# Patient Record
Sex: FEMALE | Race: WHITE | Employment: OTHER | URBAN - METROPOLITAN AREA
[De-identification: names, ages, dates, MRNs, and addresses within clinical notes are randomized per-mention and may not be internally consistent; named-entity substitution may affect disease eponyms.]

---

## 2021-01-06 ENCOUNTER — APPOINTMENT (OUTPATIENT)
Dept: GENERAL RADIOLOGY | Age: 76
DRG: 177 | End: 2021-01-06
Payer: MEDICARE

## 2021-01-06 ENCOUNTER — HOSPITAL ENCOUNTER (INPATIENT)
Age: 76
LOS: 3 days | Discharge: HOME | DRG: 177 | End: 2021-01-09
Attending: EMERGENCY MEDICINE | Admitting: INTERNAL MEDICINE
Payer: MEDICARE

## 2021-01-06 DIAGNOSIS — R09.02 HYPOXIA: Primary | ICD-10-CM

## 2021-01-06 DIAGNOSIS — Z20.822 SUSPECTED COVID-19 VIRUS INFECTION: ICD-10-CM

## 2021-01-06 PROBLEM — J12.9 VIRAL PNEUMONIA: Status: ACTIVE | Noted: 2021-01-06

## 2021-01-06 LAB
ANION GAP SERPL CALCULATED.3IONS-SCNC: 16 MMOL/L (ref 3–16)
BASE EXCESS VENOUS: -0.9 MMOL/L (ref -2–3)
BASOPHILS ABSOLUTE: 0 K/UL (ref 0–0.2)
BASOPHILS RELATIVE PERCENT: 0.1 %
BUN BLDV-MCNC: 18 MG/DL (ref 7–20)
C-REACTIVE PROTEIN: 115.4 MG/L (ref 0–5.1)
CALCIUM SERPL-MCNC: 9.7 MG/DL (ref 8.3–10.6)
CARBOXYHEMOGLOBIN: 1.9 % (ref 0–1.5)
CHLORIDE BLD-SCNC: 98 MMOL/L (ref 99–110)
CO2: 22 MMOL/L (ref 21–32)
CREAT SERPL-MCNC: 0.9 MG/DL (ref 0.6–1.2)
EKG ATRIAL RATE: 82 BPM
EKG DIAGNOSIS: NORMAL
EKG P AXIS: 41 DEGREES
EKG P-R INTERVAL: 176 MS
EKG Q-T INTERVAL: 396 MS
EKG QRS DURATION: 92 MS
EKG QTC CALCULATION (BAZETT): 462 MS
EKG R AXIS: -14 DEGREES
EKG T AXIS: 52 DEGREES
EKG VENTRICULAR RATE: 82 BPM
EOSINOPHILS ABSOLUTE: 0 K/UL (ref 0–0.6)
EOSINOPHILS RELATIVE PERCENT: 0.2 %
FERRITIN: 511.2 NG/ML (ref 15–150)
GFR AFRICAN AMERICAN: >60
GFR NON-AFRICAN AMERICAN: >60
GLUCOSE BLD-MCNC: 187 MG/DL (ref 70–99)
GLUCOSE BLD-MCNC: 263 MG/DL (ref 70–99)
HCO3 VENOUS: 24 MMOL/L (ref 24–28)
HCT VFR BLD CALC: 37.4 % (ref 36–48)
HEMOGLOBIN, VEN, REDUCED: 27.9 %
HEMOGLOBIN: 12.5 G/DL (ref 12–16)
LACTATE DEHYDROGENASE: 263 U/L (ref 100–190)
LACTIC ACID: 1.1 MMOL/L (ref 0.4–2)
LACTIC ACID: 2.3 MMOL/L (ref 0.4–2)
LYMPHOCYTES ABSOLUTE: 0.8 K/UL (ref 1–5.1)
LYMPHOCYTES RELATIVE PERCENT: 8.7 %
MCH RBC QN AUTO: 29.1 PG (ref 26–34)
MCHC RBC AUTO-ENTMCNC: 33.5 G/DL (ref 31–36)
MCV RBC AUTO: 86.9 FL (ref 80–100)
METHEMOGLOBIN VENOUS: 0.5 % (ref 0–1.5)
MONOCYTES ABSOLUTE: 0.9 K/UL (ref 0–1.3)
MONOCYTES RELATIVE PERCENT: 9.5 %
NEUTROPHILS ABSOLUTE: 7.6 K/UL (ref 1.7–7.7)
NEUTROPHILS RELATIVE PERCENT: 81.5 %
O2 SAT, VEN: 71 %
PCO2, VEN: 43.1 MMHG (ref 41–51)
PDW BLD-RTO: 14.3 % (ref 12.4–15.4)
PERFORMED ON: ABNORMAL
PH VENOUS: 7.36 (ref 7.35–7.45)
PLATELET # BLD: 203 K/UL (ref 135–450)
PMV BLD AUTO: 8.7 FL (ref 5–10.5)
PO2, VEN: 44.2 MMHG (ref 25–40)
POTASSIUM REFLEX MAGNESIUM: 4 MMOL/L (ref 3.5–5.1)
PRO-BNP: 87 PG/ML (ref 0–449)
PROCALCITONIN: 0.16 NG/ML (ref 0–0.15)
RBC # BLD: 4.3 M/UL (ref 4–5.2)
SODIUM BLD-SCNC: 136 MMOL/L (ref 136–145)
TCO2 CALC VENOUS: 25 MMOL/L
TROPONIN: <0.01 NG/ML
WBC # BLD: 9.3 K/UL (ref 4–11)

## 2021-01-06 PROCEDURE — 84484 ASSAY OF TROPONIN QUANT: CPT

## 2021-01-06 PROCEDURE — 36415 COLL VENOUS BLD VENIPUNCTURE: CPT

## 2021-01-06 PROCEDURE — 71045 X-RAY EXAM CHEST 1 VIEW: CPT

## 2021-01-06 PROCEDURE — 82728 ASSAY OF FERRITIN: CPT

## 2021-01-06 PROCEDURE — 96374 THER/PROPH/DIAG INJ IV PUSH: CPT

## 2021-01-06 PROCEDURE — 1200000000 HC SEMI PRIVATE

## 2021-01-06 PROCEDURE — 2700000000 HC OXYGEN THERAPY PER DAY

## 2021-01-06 PROCEDURE — 86140 C-REACTIVE PROTEIN: CPT

## 2021-01-06 PROCEDURE — 93005 ELECTROCARDIOGRAM TRACING: CPT | Performed by: PHYSICIAN ASSISTANT

## 2021-01-06 PROCEDURE — 6370000000 HC RX 637 (ALT 250 FOR IP): Performed by: STUDENT IN AN ORGANIZED HEALTH CARE EDUCATION/TRAINING PROGRAM

## 2021-01-06 PROCEDURE — 94761 N-INVAS EAR/PLS OXIMETRY MLT: CPT

## 2021-01-06 PROCEDURE — 2580000003 HC RX 258: Performed by: STUDENT IN AN ORGANIZED HEALTH CARE EDUCATION/TRAINING PROGRAM

## 2021-01-06 PROCEDURE — 83615 LACTATE (LD) (LDH) ENZYME: CPT

## 2021-01-06 PROCEDURE — 84145 PROCALCITONIN (PCT): CPT

## 2021-01-06 PROCEDURE — 85025 COMPLETE CBC W/AUTO DIFF WBC: CPT

## 2021-01-06 PROCEDURE — 80048 BASIC METABOLIC PNL TOTAL CA: CPT

## 2021-01-06 PROCEDURE — 83605 ASSAY OF LACTIC ACID: CPT

## 2021-01-06 PROCEDURE — 6360000002 HC RX W HCPCS: Performed by: PHYSICIAN ASSISTANT

## 2021-01-06 PROCEDURE — 83880 ASSAY OF NATRIURETIC PEPTIDE: CPT

## 2021-01-06 PROCEDURE — 6360000002 HC RX W HCPCS: Performed by: STUDENT IN AN ORGANIZED HEALTH CARE EDUCATION/TRAINING PROGRAM

## 2021-01-06 PROCEDURE — 82803 BLOOD GASES ANY COMBINATION: CPT

## 2021-01-06 PROCEDURE — U0003 INFECTIOUS AGENT DETECTION BY NUCLEIC ACID (DNA OR RNA); SEVERE ACUTE RESPIRATORY SYNDROME CORONAVIRUS 2 (SARS-COV-2) (CORONAVIRUS DISEASE [COVID-19]), AMPLIFIED PROBE TECHNIQUE, MAKING USE OF HIGH THROUGHPUT TECHNOLOGIES AS DESCRIBED BY CMS-2020-01-R: HCPCS

## 2021-01-06 PROCEDURE — 99284 EMERGENCY DEPT VISIT MOD MDM: CPT

## 2021-01-06 RX ORDER — PROMETHAZINE HYDROCHLORIDE 25 MG/1
12.5 TABLET ORAL EVERY 6 HOURS PRN
Status: DISCONTINUED | OUTPATIENT
Start: 2021-01-06 | End: 2021-01-09 | Stop reason: HOSPADM

## 2021-01-06 RX ORDER — ALBUTEROL SULFATE 90 UG/1
2 AEROSOL, METERED RESPIRATORY (INHALATION) EVERY 6 HOURS PRN
Status: DISCONTINUED | OUTPATIENT
Start: 2021-01-06 | End: 2021-01-09 | Stop reason: HOSPADM

## 2021-01-06 RX ORDER — POLYETHYLENE GLYCOL 3350 17 G/17G
17 POWDER, FOR SOLUTION ORAL DAILY PRN
Status: DISCONTINUED | OUTPATIENT
Start: 2021-01-06 | End: 2021-01-09 | Stop reason: HOSPADM

## 2021-01-06 RX ORDER — ACETAMINOPHEN 650 MG/1
650 SUPPOSITORY RECTAL EVERY 6 HOURS PRN
Status: DISCONTINUED | OUTPATIENT
Start: 2021-01-06 | End: 2021-01-09 | Stop reason: HOSPADM

## 2021-01-06 RX ORDER — DEXAMETHASONE SODIUM PHOSPHATE 4 MG/ML
6 INJECTION, SOLUTION INTRA-ARTICULAR; INTRALESIONAL; INTRAMUSCULAR; INTRAVENOUS; SOFT TISSUE ONCE
Status: COMPLETED | OUTPATIENT
Start: 2021-01-06 | End: 2021-01-06

## 2021-01-06 RX ORDER — ACETAMINOPHEN 325 MG/1
650 TABLET ORAL EVERY 6 HOURS PRN
Status: DISCONTINUED | OUTPATIENT
Start: 2021-01-06 | End: 2021-01-09 | Stop reason: HOSPADM

## 2021-01-06 RX ORDER — SERTRALINE HYDROCHLORIDE 25 MG/1
25 TABLET, FILM COATED ORAL DAILY
COMMUNITY

## 2021-01-06 RX ORDER — ASPIRIN 81 MG/1
81 TABLET, CHEWABLE ORAL DAILY
Status: DISCONTINUED | OUTPATIENT
Start: 2021-01-06 | End: 2021-01-09 | Stop reason: HOSPADM

## 2021-01-06 RX ORDER — ONDANSETRON 2 MG/ML
4 INJECTION INTRAMUSCULAR; INTRAVENOUS EVERY 6 HOURS PRN
Status: DISCONTINUED | OUTPATIENT
Start: 2021-01-06 | End: 2021-01-09 | Stop reason: HOSPADM

## 2021-01-06 RX ORDER — DEXTROSE MONOHYDRATE 50 MG/ML
100 INJECTION, SOLUTION INTRAVENOUS PRN
Status: DISCONTINUED | OUTPATIENT
Start: 2021-01-06 | End: 2021-01-09 | Stop reason: HOSPADM

## 2021-01-06 RX ORDER — LISINOPRIL 5 MG/1
5 TABLET ORAL DAILY
Status: DISCONTINUED | OUTPATIENT
Start: 2021-01-06 | End: 2021-01-09 | Stop reason: HOSPADM

## 2021-01-06 RX ORDER — NICOTINE POLACRILEX 4 MG
15 LOZENGE BUCCAL PRN
Status: DISCONTINUED | OUTPATIENT
Start: 2021-01-06 | End: 2021-01-09 | Stop reason: HOSPADM

## 2021-01-06 RX ORDER — INSULIN LISPRO 100 [IU]/ML
0-3 INJECTION, SOLUTION INTRAVENOUS; SUBCUTANEOUS NIGHTLY
Status: DISCONTINUED | OUTPATIENT
Start: 2021-01-06 | End: 2021-01-09 | Stop reason: HOSPADM

## 2021-01-06 RX ORDER — VITAMIN B COMPLEX
2000 TABLET ORAL DAILY
Status: DISCONTINUED | OUTPATIENT
Start: 2021-01-06 | End: 2021-01-09 | Stop reason: HOSPADM

## 2021-01-06 RX ORDER — SERTRALINE HYDROCHLORIDE 25 MG/1
25 TABLET, FILM COATED ORAL DAILY
Status: DISCONTINUED | OUTPATIENT
Start: 2021-01-06 | End: 2021-01-09 | Stop reason: HOSPADM

## 2021-01-06 RX ORDER — PANTOPRAZOLE SODIUM 40 MG/1
40 TABLET, DELAYED RELEASE ORAL
Status: DISCONTINUED | OUTPATIENT
Start: 2021-01-07 | End: 2021-01-09 | Stop reason: HOSPADM

## 2021-01-06 RX ORDER — ROSUVASTATIN CALCIUM 20 MG/1
20 TABLET, COATED ORAL NIGHTLY
COMMUNITY

## 2021-01-06 RX ORDER — INSULIN LISPRO 100 [IU]/ML
0-6 INJECTION, SOLUTION INTRAVENOUS; SUBCUTANEOUS
Status: DISCONTINUED | OUTPATIENT
Start: 2021-01-07 | End: 2021-01-09 | Stop reason: HOSPADM

## 2021-01-06 RX ORDER — ALBUTEROL SULFATE 90 UG/1
2 AEROSOL, METERED RESPIRATORY (INHALATION) EVERY 6 HOURS PRN
COMMUNITY

## 2021-01-06 RX ORDER — BUDESONIDE AND FORMOTEROL FUMARATE DIHYDRATE 80; 4.5 UG/1; UG/1
1 AEROSOL RESPIRATORY (INHALATION) DAILY
Status: DISCONTINUED | OUTPATIENT
Start: 2021-01-07 | End: 2021-01-09 | Stop reason: HOSPADM

## 2021-01-06 RX ORDER — ROSUVASTATIN CALCIUM 20 MG/1
20 TABLET, COATED ORAL NIGHTLY
Status: DISCONTINUED | OUTPATIENT
Start: 2021-01-06 | End: 2021-01-09 | Stop reason: HOSPADM

## 2021-01-06 RX ORDER — SODIUM CHLORIDE 0.9 % (FLUSH) 0.9 %
10 SYRINGE (ML) INJECTION EVERY 12 HOURS SCHEDULED
Status: DISCONTINUED | OUTPATIENT
Start: 2021-01-06 | End: 2021-01-09 | Stop reason: HOSPADM

## 2021-01-06 RX ORDER — CICLOPIROX 1 G/100ML
SHAMPOO TOPICAL DAILY
COMMUNITY

## 2021-01-06 RX ORDER — DEXAMETHASONE 4 MG/1
6 TABLET ORAL DAILY
Status: DISCONTINUED | OUTPATIENT
Start: 2021-01-07 | End: 2021-01-09 | Stop reason: HOSPADM

## 2021-01-06 RX ORDER — SODIUM CHLORIDE 0.9 % (FLUSH) 0.9 %
10 SYRINGE (ML) INJECTION PRN
Status: DISCONTINUED | OUTPATIENT
Start: 2021-01-06 | End: 2021-01-09 | Stop reason: HOSPADM

## 2021-01-06 RX ORDER — DEXTROSE MONOHYDRATE 25 G/50ML
12.5 INJECTION, SOLUTION INTRAVENOUS PRN
Status: DISCONTINUED | OUTPATIENT
Start: 2021-01-06 | End: 2021-01-09 | Stop reason: HOSPADM

## 2021-01-06 RX ORDER — GUAIFENESIN/DEXTROMETHORPHAN 100-10MG/5
5 SYRUP ORAL EVERY 4 HOURS PRN
Status: DISCONTINUED | OUTPATIENT
Start: 2021-01-06 | End: 2021-01-09 | Stop reason: HOSPADM

## 2021-01-06 RX ORDER — METFORMIN HYDROCHLORIDE 500 MG/1
1000 TABLET, EXTENDED RELEASE ORAL 2 TIMES DAILY WITH MEALS
COMMUNITY

## 2021-01-06 RX ADMIN — SERTRALINE HYDROCHLORIDE 25 MG: 25 TABLET ORAL at 22:20

## 2021-01-06 RX ADMIN — INSULIN LISPRO 2 UNITS: 100 INJECTION, SOLUTION INTRAVENOUS; SUBCUTANEOUS at 22:27

## 2021-01-06 RX ADMIN — Medication 2000 UNITS: at 22:20

## 2021-01-06 RX ADMIN — DEXAMETHASONE SODIUM PHOSPHATE 6 MG: 4 INJECTION, SOLUTION INTRAMUSCULAR; INTRAVENOUS at 18:39

## 2021-01-06 RX ADMIN — ROSUVASTATIN CALCIUM 20 MG: 20 TABLET, COATED ORAL at 22:21

## 2021-01-06 RX ADMIN — Medication 10 ML: at 22:21

## 2021-01-06 RX ADMIN — LISINOPRIL 5 MG: 5 TABLET ORAL at 22:21

## 2021-01-06 RX ADMIN — ENOXAPARIN SODIUM 30 MG: 30 INJECTION SUBCUTANEOUS at 22:21

## 2021-01-06 RX ADMIN — ASPIRIN 81 MG: 81 TABLET, CHEWABLE ORAL at 22:20

## 2021-01-06 ASSESSMENT — ENCOUNTER SYMPTOMS
ABDOMINAL PAIN: 0
VOMITING: 0
SHORTNESS OF BREATH: 1
RESPIRATORY NEGATIVE: 1
DIARRHEA: 0
COUGH: 1
EYES NEGATIVE: 1
GASTROINTESTINAL NEGATIVE: 1
EYE REDNESS: 0
NAUSEA: 0

## 2021-01-06 NOTE — ED PROVIDER NOTES
[address] ENCOUNTER          PHYSICIAN ASSISTANT NOTE       Date of evaluation: 2021    Chief Complaint     Shortness of Breath (Pt reports being tested for COVID, pending results- however Sob has increased. RA sat 83% only recovers to 87% with rest. Pt -placed on suppelmental oxygen at 3 l/min to bring o2 sat up to 96%) and Concern For COVID-19      History of Present Illness     Adolfo Miller is a 66 y.o. female with PMH of Diabetes, HTN, HLD who presents with Shortness of breath. Patient reports that on  3 days ago, she developed a cough, sneezing and a runny nose. She states that she went to an Urgent Care and had a COVID-19 test. This is still pending. She reports that in the past few days, she has begun feeling short of breath. Her daughter brought home a pulse oximeter and noted her Sp02 was 84% on RA. Patient states that she has also been feeling fatigued and nauseous. She denies any known fevers, chills, loss of taste or smell, chest pain, shortness of breath, abdominal pain, vomiting, change in bowel or urinary habits, leg swelling or pain. She states that she did recently travel via plane to [facility] to attend the  of her brother in law. Review of Systems     Review of Systems   Constitutional: Positive for appetite change and fatigue. Negative for chills and fever. HENT: Positive for congestion. Eyes: Negative for redness. Respiratory: Positive for cough and shortness of breath. Cardiovascular: Negative for chest pain. Gastrointestinal: Negative for abdominal pain, diarrhea, nausea and vomiting. Genitourinary: Negative for difficulty urinating. Musculoskeletal: Negative for gait problem. Skin: Negative for wound. Neurological: Negative for dizziness. Psychiatric/Behavioral: The patient is not nervous/anxious.         Past Medical, Surgical, Family, and Social History     She has a past medical history of Arthritis, Diabetes mellitus ([address] Hyperlipidemia, Hypertension, Nausea & vomiting, Obese, and Spinal stenosis. She has a past surgical history that includes Hysterectomy; Dilation and curettage of uterus; Cholecystectomy, laparoscopic (500952); eye surgery; and joint replacement. Her family history is not on file. She reports that she quit smoking about 30 years ago. She does not have any smokeless tobacco history on file. She reports current alcohol use. She reports that she does not use drugs. Medications     Previous Medications    ASPIRIN 81 MG CHEWABLE TABLET    Take 81 mg by mouth daily. Last dose 1 week ago    BENZONATATE (TESSALON PERLES) 100 MG CAPSULE    Take 1 capsule by mouth 2 times daily as needed for Cough    HYDROCODONE-ACETAMINOPHEN (CO-GESIC) 5-500 MG PER TABLET    Take 1-2 tabs po q 4 hours PRN Pain        LISINOPRIL (PRINIVIL;ZESTRIL) 5 MG TABLET    Take 2.5 mg by mouth daily. METFORMIN (GLUCOPHAGE) 500 MG TABLET    Take 500 mg by mouth 2 times daily (with meals). Last dose take tuesday    MULTIPLE VITAMINS-MINERALS (PX SENIOR VITAMIN PO)    Take  by mouth. ROSUVASTATIN (CRESTOR) 5 MG TABLET    Take 5 mg by mouth daily. VITAMIN D (CHOLECALCIFEROL) 1000 UNIT CAPS CAPSULE    Take 1,000 Units by mouth daily. Allergies     She is allergic to codeine and statins depletion therapy. Physical Exam     INITIAL VITALS: BP: (!) 120/108,Temp: 98.2 °F (36.8 °C), Pulse: 88, Resp: 30, SpO2: (!) 83 %(RA sat 83% - placed on supplemental o2 at 3 l/min via NC to bring sat up to 95-96%)   Physical Exam  Vitals signs and nursing note reviewed. Constitutional:       General: She is not in acute distress. HENT:      Head: Normocephalic and atraumatic. Mouth/Throat:      Mouth: Mucous membranes are moist.   Eyes:      Extraocular Movements: Extraocular movements intact. Conjunctiva/sclera: Conjunctivae normal.   Neck:      Musculoskeletal: Neck supple.    Cardiovascular:      Rate and Rhythm: Normal rate and regular rhythm. Pulmonary:      Effort: Pulmonary effort is normal. No respiratory distress. Breath sounds: Normal breath sounds. No wheezing, rhonchi or rales. Abdominal:      General: Bowel sounds are normal. There is no distension. Palpations: Abdomen is soft. Tenderness: There is no abdominal tenderness. There is no guarding or rebound. Musculoskeletal:         General: No deformity. Skin:     General: Skin is warm and dry. Neurological:      Mental Status: She is alert and oriented to person, place, and time. Psychiatric:         Mood and Affect: Mood normal.         Behavior: Behavior normal.         Diagnostic Results     EKG   Interpreted in conjunction with emergencydepartment physician Vamsi Shine MD  Rhythm: normal sinus   Rate: normal  Axis: normal  Ectopy: none  Conduction: normal  ST Segments: normal  T Waves:normal  Q Waves: none  Clinical Impression: no acute changes  Comparison:  09/30/2017    RADIOLOGY:  XR CHEST PORTABLE   Final Result      Moderate basilar atelectasis/scarring. Patchy opacity left lung base-atelectasis versus pneumonia. Normal cardiomediastinal silhouette.           LABS:   Results for orders placed or performed during the hospital encounter of 01/06/21   CBC Auto Differential   Result Value Ref Range    WBC 9.3 4.0 - 11.0 K/uL    RBC 4.30 4.00 - 5.20 M/uL    Hemoglobin 12.5 12.0 - 16.0 g/dL    Hematocrit 37.4 36.0 - 48.0 %    MCV 86.9 80.0 - 100.0 fL    MCH 29.1 26.0 - 34.0 pg    MCHC 33.5 31.0 - 36.0 g/dL    RDW 14.3 12.4 - 15.4 %    Platelets 221 906 - 832 K/uL    MPV 8.7 5.0 - 10.5 fL    Neutrophils % 81.5 %    Lymphocytes % 8.7 %    Monocytes % 9.5 %    Eosinophils % 0.2 %    Basophils % 0.1 %    Neutrophils Absolute 7.6 1.7 - 7.7 K/uL    Lymphocytes Absolute 0.8 (L) 1.0 - 5.1 K/uL    Monocytes Absolute 0.9 0.0 - 1.3 K/uL    Eosinophils Absolute 0.0 0.0 - 0.6 K/uL    Basophils Absolute 0.0 0.0 - 0.2 K/uL   Basic Metabolic Panel w/ Reflex to MG   Result Value Ref Range    Sodium 136 136 - 145 mmol/L    Potassium reflex Magnesium 4.0 3.5 - 5.1 mmol/L    Chloride 98 (L) 99 - 110 mmol/L    CO2 22 21 - 32 mmol/L    Anion Gap 16 3 - 16    Glucose 187 (H) 70 - 99 mg/dL    BUN 18 7 - 20 mg/dL    CREATININE 0.9 0.6 - 1.2 mg/dL    GFR Non-African American >60 >60    GFR African American >60 >60    Calcium 9.7 8.3 - 10.6 mg/dL   Troponin   Result Value Ref Range    Troponin <0.01 <0.01 ng/mL   Brain Natriuretic Peptide   Result Value Ref Range    Pro-BNP 87 0 - 449 pg/mL   Blood gas, venous (Lab)   Result Value Ref Range    pH, Oswaldo 7.364 7.350 - 7.450    pCO2, Oswaldo 43.1 41.0 - 51.0 mmHg    pO2, Oswaldo 44.2 (H) 25.0 - 40.0 mmHg    HCO3, Venous 24.0 24.0 - 28.0 mmol/L    Base Excess, Oswaldo -0.9 -2.0 - 3.0 mmol/L    O2 Sat, Oswaldo 71 Not established %    Carboxyhemoglobin 1.9 (H) 0.0 - 1.5 %    MetHgb, Oswaldo 0.5 0.0 - 1.5 %    TC02 (Calc), Oswaldo 25 mmol/L    Hemoglobin, Oswaldo, Reduced 27.90 %   Lactate, plasma   Result Value Ref Range    Lactic Acid 2.3 (H) 0.4 - 2.0 mmol/L       RECENT VITALS:  BP: (!) 120/108, Temp: 98.2 °F (36.8 °C), Pulse: 88,Resp: 30, SpO2: 95 %     Procedures         ED Course     Nursing Notes, Past Medical Hx, Past Surgical Hx, Social Hx, Allergies, and Family Hx were reviewed. The patient was given the followingmedications:  No orders of the defined types were placed in this encounter. CONSULTS:  None    MEDICAL DECISION MAKING / ASSESSMENT / PLAN     Sunshine Pineda is a 66 y.o. female with PMH of Diabetes, HTN, HLD who presents with Shortness of breath. Patient endorses several days of rhinorrhea, sneezing, cough and shortness of breath. Today, she noted hypoxia to 84% on RA on home pulse oximeter, which prompted her ED visit. She denies any known fevers, chest pain, n/v/d, change in urinary habits, body aches, leg swelling or pain. Physical exam reveals 66year old female in no acute respiratory distress.  Sp02

## 2021-01-06 NOTE — PROGRESS NOTES
Pharmacy Consult Note  - Admission Medication Reconciliation      Pharmacy consulted for reconciliation of jkxzk-yv-hsmywpmes medications. I reviewed Rx fill history via \"Complete Dispense Report\" in [facility], and outpatient pharmacy records. The following changes made to uwyes-wp-pyxqqzqdl medication list:    ADDED:  1. Sertraline  2. Albuterol  3. Ciclopirox     Dose or Frequency CHANGE:  1. Lisinopril from 2.5mg to 5mg  2. Metformin 500mg BID to Metformin ER 1000mg BID  3. Rosuvastatin from 5mg to 20mg    REMOVED:  1. Benzonatate  2. Hydrocodone/acetaminophen      UPDATED HOME MEDICATION LIST  No current facility-administered medications on file prior to encounter. Medication Sig    metFORMIN (GLUCOPHAGE-XR) 500 MG extended release tablet Take 1,000 mg by mouth 2 times daily (with meals)    albuterol sulfate HFA (VENTOLIN HFA) 108 (90 Base) MCG/ACT inhaler Inhale 2 puffs into the lungs every 6 hours as needed for Wheezing    Ciclopirox 1 % SHAM Apply topically daily Apply to affected toenails    rosuvastatin (CRESTOR) 20 MG tablet Take 20 mg by mouth nightly    sertraline (ZOLOFT) 25 MG tablet Take 25 mg by mouth daily    lisinopril (PRINIVIL;ZESTRIL) 5 MG tablet Take 5 mg by mouth daily     Vitamin D (CHOLECALCIFEROL) 1000 UNIT CAPS capsule Take 1,000 Units by mouth daily.  aspirin 81 MG chewable tablet Take 81 mg by mouth daily    Multiple Vitamins-Minerals (PX SENIOR VITAMIN PO) Take  by mouth. Thank you for the consult  Please call with questions:    Toya HARRISON   1/6/2021 6:17 PM  [phone] (9 [facility])

## 2021-01-06 NOTE — ED PROVIDER NOTES
ED Attending Attestation Note     Date of evaluation: 1/6/2021    This patient was seen by the advance practice provider. I have seen and examined the patient, agree with the workup, evaluation, management and diagnosis. The care plan has been discussed. My assessment reveals patient with SOB, concerned for COVID, hypoxic. CXR showing infiltrate and will need admission due to hypoxia.      Justin Hughes MD  01/06/21 Aaron Lambert

## 2021-01-07 ENCOUNTER — APPOINTMENT (OUTPATIENT)
Dept: CT IMAGING | Age: 76
DRG: 177 | End: 2021-01-07
Payer: MEDICARE

## 2021-01-07 PROBLEM — I10 ESSENTIAL HYPERTENSION: Status: ACTIVE | Noted: 2021-01-07

## 2021-01-07 PROBLEM — R09.02 HYPOXIA: Status: ACTIVE | Noted: 2021-01-07

## 2021-01-07 LAB
A/G RATIO: 1.1 (ref 1.1–2.2)
ALBUMIN SERPL-MCNC: 4.1 G/DL (ref 3.4–5)
ALP BLD-CCNC: 69 U/L (ref 40–129)
ALT SERPL-CCNC: 18 U/L (ref 10–40)
ANION GAP SERPL CALCULATED.3IONS-SCNC: 12 MMOL/L (ref 3–16)
AST SERPL-CCNC: 20 U/L (ref 15–37)
BASOPHILS ABSOLUTE: 0 K/UL (ref 0–0.2)
BASOPHILS RELATIVE PERCENT: 0.2 %
BILIRUB SERPL-MCNC: 0.3 MG/DL (ref 0–1)
BUN BLDV-MCNC: 22 MG/DL (ref 7–20)
CALCIUM SERPL-MCNC: 9.9 MG/DL (ref 8.3–10.6)
CHLORIDE BLD-SCNC: 102 MMOL/L (ref 99–110)
CO2: 23 MMOL/L (ref 21–32)
CREAT SERPL-MCNC: 0.7 MG/DL (ref 0.6–1.2)
D DIMER: 440 NG/ML DDU (ref 0–229)
EOSINOPHILS ABSOLUTE: 0 K/UL (ref 0–0.6)
EOSINOPHILS RELATIVE PERCENT: 0 %
FIBRINOGEN: 658 MG/DL (ref 200–397)
GFR AFRICAN AMERICAN: >60
GFR NON-AFRICAN AMERICAN: >60
GLOBULIN: 3.7 G/DL
GLUCOSE BLD-MCNC: 221 MG/DL (ref 70–99)
GLUCOSE BLD-MCNC: 271 MG/DL (ref 70–99)
GLUCOSE BLD-MCNC: 272 MG/DL (ref 70–99)
GLUCOSE BLD-MCNC: 296 MG/DL (ref 70–99)
GLUCOSE BLD-MCNC: 319 MG/DL (ref 70–99)
HCT VFR BLD CALC: 35.8 % (ref 36–48)
HEMOGLOBIN: 12.3 G/DL (ref 12–16)
LYMPHOCYTES ABSOLUTE: 0.8 K/UL (ref 1–5.1)
LYMPHOCYTES RELATIVE PERCENT: 10.8 %
MCH RBC QN AUTO: 29 PG (ref 26–34)
MCHC RBC AUTO-ENTMCNC: 34.3 G/DL (ref 31–36)
MCV RBC AUTO: 84.4 FL (ref 80–100)
MONOCYTES ABSOLUTE: 0.4 K/UL (ref 0–1.3)
MONOCYTES RELATIVE PERCENT: 6.3 %
NEUTROPHILS ABSOLUTE: 5.8 K/UL (ref 1.7–7.7)
NEUTROPHILS RELATIVE PERCENT: 82.7 %
PDW BLD-RTO: 14 % (ref 12.4–15.4)
PERFORMED ON: ABNORMAL
PLATELET # BLD: 215 K/UL (ref 135–450)
PMV BLD AUTO: 8.6 FL (ref 5–10.5)
POTASSIUM REFLEX MAGNESIUM: 4.5 MMOL/L (ref 3.5–5.1)
RBC # BLD: 4.25 M/UL (ref 4–5.2)
SARS-COV-2, PCR: DETECTED
SODIUM BLD-SCNC: 137 MMOL/L (ref 136–145)
TOTAL PROTEIN: 7.8 G/DL (ref 6.4–8.2)
WBC # BLD: 7.1 K/UL (ref 4–11)

## 2021-01-07 PROCEDURE — 6370000000 HC RX 637 (ALT 250 FOR IP): Performed by: STUDENT IN AN ORGANIZED HEALTH CARE EDUCATION/TRAINING PROGRAM

## 2021-01-07 PROCEDURE — 85384 FIBRINOGEN ACTIVITY: CPT

## 2021-01-07 PROCEDURE — 97530 THERAPEUTIC ACTIVITIES: CPT

## 2021-01-07 PROCEDURE — 94640 AIRWAY INHALATION TREATMENT: CPT

## 2021-01-07 PROCEDURE — 83036 HEMOGLOBIN GLYCOSYLATED A1C: CPT

## 2021-01-07 PROCEDURE — 2580000003 HC RX 258: Performed by: STUDENT IN AN ORGANIZED HEALTH CARE EDUCATION/TRAINING PROGRAM

## 2021-01-07 PROCEDURE — 2700000000 HC OXYGEN THERAPY PER DAY

## 2021-01-07 PROCEDURE — 6360000004 HC RX CONTRAST MEDICATION: Performed by: INTERNAL MEDICINE

## 2021-01-07 PROCEDURE — 6360000002 HC RX W HCPCS: Performed by: STUDENT IN AN ORGANIZED HEALTH CARE EDUCATION/TRAINING PROGRAM

## 2021-01-07 PROCEDURE — 80053 COMPREHEN METABOLIC PANEL: CPT

## 2021-01-07 PROCEDURE — 1200000000 HC SEMI PRIVATE

## 2021-01-07 PROCEDURE — 97535 SELF CARE MNGMENT TRAINING: CPT

## 2021-01-07 PROCEDURE — 85025 COMPLETE CBC W/AUTO DIFF WBC: CPT

## 2021-01-07 PROCEDURE — 94761 N-INVAS EAR/PLS OXIMETRY MLT: CPT

## 2021-01-07 PROCEDURE — 6370000000 HC RX 637 (ALT 250 FOR IP): Performed by: INTERNAL MEDICINE

## 2021-01-07 PROCEDURE — 97165 OT EVAL LOW COMPLEX 30 MIN: CPT

## 2021-01-07 PROCEDURE — 97116 GAIT TRAINING THERAPY: CPT

## 2021-01-07 PROCEDURE — 97161 PT EVAL LOW COMPLEX 20 MIN: CPT

## 2021-01-07 PROCEDURE — 85379 FIBRIN DEGRADATION QUANT: CPT

## 2021-01-07 PROCEDURE — 71260 CT THORAX DX C+: CPT

## 2021-01-07 RX ORDER — AZITHROMYCIN 250 MG/1
500 TABLET, FILM COATED ORAL DAILY
Status: DISCONTINUED | OUTPATIENT
Start: 2021-01-07 | End: 2021-01-07

## 2021-01-07 RX ADMIN — INSULIN GLARGINE 10 UNITS: 100 INJECTION, SOLUTION SUBCUTANEOUS at 08:42

## 2021-01-07 RX ADMIN — DEXAMETHASONE 6 MG: 4 TABLET ORAL at 08:45

## 2021-01-07 RX ADMIN — Medication 10 ML: at 08:44

## 2021-01-07 RX ADMIN — IOPAMIDOL 80 ML: 755 INJECTION, SOLUTION INTRAVENOUS at 14:04

## 2021-01-07 RX ADMIN — INSULIN LISPRO 2 UNITS: 100 INJECTION, SOLUTION INTRAVENOUS; SUBCUTANEOUS at 20:29

## 2021-01-07 RX ADMIN — INSULIN LISPRO 2 UNITS: 100 INJECTION, SOLUTION INTRAVENOUS; SUBCUTANEOUS at 08:42

## 2021-01-07 RX ADMIN — LISINOPRIL 5 MG: 5 TABLET ORAL at 08:45

## 2021-01-07 RX ADMIN — Medication 10 ML: at 20:28

## 2021-01-07 RX ADMIN — BUDESONIDE AND FORMOTEROL FUMARATE DIHYDRATE 1 PUFF: 80; 4.5 AEROSOL RESPIRATORY (INHALATION) at 08:10

## 2021-01-07 RX ADMIN — Medication 2000 UNITS: at 08:45

## 2021-01-07 RX ADMIN — SERTRALINE HYDROCHLORIDE 25 MG: 25 TABLET ORAL at 08:45

## 2021-01-07 RX ADMIN — ENOXAPARIN SODIUM 40 MG: 40 INJECTION SUBCUTANEOUS at 08:44

## 2021-01-07 RX ADMIN — AZITHROMYCIN 500 MG: 250 TABLET, FILM COATED ORAL at 12:48

## 2021-01-07 RX ADMIN — ENOXAPARIN SODIUM 40 MG: 40 INJECTION SUBCUTANEOUS at 20:27

## 2021-01-07 RX ADMIN — ROSUVASTATIN CALCIUM 20 MG: 20 TABLET, COATED ORAL at 20:27

## 2021-01-07 RX ADMIN — INSULIN LISPRO 3 UNITS: 100 INJECTION, SOLUTION INTRAVENOUS; SUBCUTANEOUS at 12:48

## 2021-01-07 RX ADMIN — BUDESONIDE AND FORMOTEROL FUMARATE DIHYDRATE 1 PUFF: 80; 4.5 AEROSOL RESPIRATORY (INHALATION) at 19:58

## 2021-01-07 RX ADMIN — ASPIRIN 81 MG: 81 TABLET, CHEWABLE ORAL at 08:44

## 2021-01-07 RX ADMIN — INSULIN LISPRO 3 UNITS: 100 INJECTION, SOLUTION INTRAVENOUS; SUBCUTANEOUS at 18:04

## 2021-01-07 RX ADMIN — PANTOPRAZOLE SODIUM 40 MG: 40 TABLET, DELAYED RELEASE ORAL at 06:27

## 2021-01-07 ASSESSMENT — ENCOUNTER SYMPTOMS
DIARRHEA: 0
SHORTNESS OF BREATH: 1
COUGH: 1
WHEEZING: 0
NAUSEA: 0
ABDOMINAL PAIN: 0

## 2021-01-07 ASSESSMENT — PAIN SCALES - GENERAL
PAINLEVEL_OUTOF10: 0

## 2021-01-07 NOTE — PROGRESS NOTES
Physical Therapy    Facility/Department: [place] [address] [facility]  Initial Assessment    NAME: Jovani Escalante  : 1945  MRN: [id number]    Date of Service: 2021    Discharge Recommendations:    Jovani Escalante scored a 24/24 on the AM-PAC short mobility form. Current research shows that an AM-PAC score of 18 or greater is typically associated with a discharge to the patient's home setting. Based on the patient's AM-PAC score and their current functional mobility deficits, it is recommended that the patient have 2-3 sessions per week of Physical Therapy at d/c to increase the patient's independence. At this time, this patient demonstrates the endurance and safety to discharge home with (home vs OP services) and a follow up treatment frequency of 2-3x/wk. Please see assessment section for further patient specific details. If patient discharges prior to next session this note will serve as a discharge summary. Please see below for the latest assessment towards goals. PT Equipment Recommendations  Equipment Needed: No    Assessment   Assessment: Pt normally independent at home with adl and functional mobility/gait. She is currently up ad luis in her room. No therapy needs identified at this time  Treatment Diagnosis: Decreased functional mobility 2/2 BLAKE  Prognosis: Good  Decision Making: Low Complexity  PT Education: PT Role;General Safety  Patient Education: pt demonstrates understanding  Barriers to Learning: none noted  REQUIRES PT FOLLOW UP: No  Activity Tolerance  Activity Tolerance: Patient Tolerated treatment well       Patient Diagnosis(es): The primary encounter diagnosis was Hypoxia. A diagnosis of Suspected COVID-19 virus infection was also pertinent to this visit. has a past medical history of Arthritis, Diabetes mellitus ([address] Hyperlipidemia, Hypertension, Nausea & vomiting, Obese, and Spinal stenosis.    has a past surgical history that includes Hysterectomy; Dilation and curettage of uterus; Cholecystectomy, laparoscopic (053447); eye surgery; and joint replacement. Restrictions  Position Activity Restriction  Other position/activity restrictions: up with assist  Vision/Hearing  Vision: Impaired  Vision Exceptions: Wears glasses for reading  Hearing: Within functional limits     Subjective  General  Chart Reviewed: Yes  Additional Pertinent Hx: DM, HLD, HTN, Nausea, obesity, spinal stenosis,  THR-right  Referring Practitioner: Tobias  Diagnosis: Pt adm 1/6 with hypoxia.   Subjective  Subjective: Pt supine in bed upon PT entry, notes she has been getting herself to and from the BR  Pain Screening  Patient Currently in Pain: Denies  Vital Signs  Patient Currently in Pain: Denies       Orientation  Orientation  Overall Orientation Status: Within Normal Limits  Social/Functional History  Social/Functional History  Lives With: Family(with daughter, son in law and grandson)  Type of Home: House  Home Layout: One level  Home Access: Stairs to enter without rails  Entrance Stairs - Number of Steps: three steps into home, then 12 to lower level which is her area  Lena Shower/Tub: Walk-in shower  Bathroom Toilet: Handicap height(can push up from vanity)  Bathroom Equipment: Grab bars in shower, Hand-held shower, Shower chair  Bathroom Accessibility: Accessible  Home Equipment: Cane, Rolling walker, Reacher, Sock aid(does not currently use)  Receives Help From: Family  ADL Assistance: Independent  Homemaking Assistance: Independent  Ambulation Assistance: Independent  Transfer Assistance: Independent  Active : Yes  Occupation: Part time employment  Type of occupation: jbilling  Leisure & Hobbies: reading, painting  Cognition        Objective          AROM RLE (degrees)  RLE AROM: WFL  AROM LLE (degrees)  LLE AROM : WFL  Strength RLE  Strength RLE: WFL  Strength LLE  Strength LLE: WFL        Bed mobility  Supine to Sit: Modified independent  Sit to Supine: Independent  Transfers  Sit to Stand: Independent  Stand to sit:  Independent  Ambulation  Ambulation?: Yes  Ambulation 1  Surface: level tile  Device: No Device  Assistance: Independent  Quality of Gait: steady gait, no LOB, manages O2 line well     Balance  Comments: Good stability with functional mobility and gait    Good stability with pericare, washing up at sink/ doffing and donning gown    Plan   Plan  Times per week: DC from acute PT  Safety Devices  Type of devices: Left in chair, Call light within reach, Nurse notified(pt and RN aware no chair alarm)    Therapy Time   Individual Concurrent Group Co-treatment   Time In 0900         Time Out 0938         Minutes 38               Timed Code Treatment Minutes:    25  Total Treatment Minutes:  [address], [id number]

## 2021-01-07 NOTE — PROGRESS NOTES
Progress Note    Admit Date: 1/6/2021  Day: 2  Diet: DIET CARB CONTROL;    CC: dyspnea    Interval history:   NAEO. Patient endorses dyspnea on deep inspiration and with ambulation and dry cough. Dyspena is same in intensity and has not increased since yesterday. Worked with PT/OT and did well. Resting oxygen sats 90% on 3L NC drop to 85% on room air at rest. Would anticipate further drop w/ ambulation. Denies any new symptoms. Denies fever, chills, diarrhea, cest pain, anosmia, n/v, abd pain, dysuria, headache. Daughter has sore throat but no other symptoms, her covid test is pending. Medications:     Scheduled Meds:   enoxaparin  40 mg Subcutaneous BID    insulin glargine  10 Units Subcutaneous Daily    aspirin  81 mg Oral Daily    lisinopril  5 mg Oral Daily    rosuvastatin  20 mg Oral Nightly    sertraline  25 mg Oral Daily    sodium chloride flush  10 mL Intravenous 2 times per day    pantoprazole  40 mg Oral QAM AC    Vitamin D  2,000 Units Oral Daily    dexamethasone  6 mg Oral Daily    insulin lispro  0-6 Units Subcutaneous TID WC    insulin lispro  0-3 Units Subcutaneous Nightly    budesonide-formoterol  1 puff Inhalation Daily     Continuous Infusions:   dextrose       PRN Meds:albuterol sulfate HFA, sodium chloride flush, promethazine **OR** ondansetron, polyethylene glycol, acetaminophen **OR** acetaminophen, guaiFENesin-dextromethorphan, glucose, dextrose, glucagon (rDNA), dextrose    Objective:   Vitals:   T-max:  Patient Vitals for the past 8 hrs:   BP Temp Temp src Pulse Resp SpO2 Height   01/07/21 0833 (!) 148/76 96.8 °F (36 °C) Oral 73 (!) 92 92 % --   01/07/21 0810 -- -- -- -- -- 92 % --   01/07/21 0615 -- -- -- -- -- -- 5' 4.02\" (1.626 m)   01/07/21 0451 128/76 96.9 °F (36.1 °C) Oral 71 16 94 % --     No intake or output data in the 24 hours ending 01/07/21 1008    Review of Systems   Constitutional: Negative for fatigue and fever.    Respiratory: Positive for cough and Result      Moderate basilar atelectasis/scarring. Patchy opacity left lung base-atelectasis versus pneumonia. Normal cardiomediastinal silhouette. Assessment/Plan:   Sunshine Pineda is a 66 y.o. female w/ PMHx of Obesity, DMII, HTN, HLD, who p/w worsening shortness of breath since 1/03.       Acute hypoxic respiratory failure 2/2 likely COVID-19 pneumonia   Dyspnea and dry cough. Requiring supplemental oxygen. Lymphopenia. Imagining w/ patchy left airspace disease. Pro-alexy 0.16, elevated inflammatory markers  - supplemental oxygen, wean as appropriate, keep sats >88% (currently on 3L NC)  - decadron (day 2/10), elevated sugars   - azithromycin for possible atypical pna  - AC w/ lovenox 30 BID, d-dimer 440  - COVID PCR pending  - will consider remdesivir if test positive   - f/u strep pneumo antigen, Legionella, respiratory culture  - PT/OT      DMII  Holding home Metformin. LA 2.3  - Lantus 10 U  + LDSSI   - hyperglycemia 2/2 steroids initiation   - HgA1C pending      Lactic acidosis 2/2 hypoxia - resolved   - LA 23.  On admit, normalized with fluids     HTN  - cont lisinopril 10 mg      HLD  - cont crestor 20 mg    Code Status: Full   FEN: Carb control diet   PPX: Lovenox 30 BID  DISPO: GMF w/ Radha Alvarez MD, PGY-1  01/07/21  10:08 AM    This patient has been staffed and discussed with Maurilio Villalobos MD.

## 2021-01-07 NOTE — CARE COORDINATION
Case Management Assessment           Initial Evaluation                Date / Time of Evaluation: 1/7/2021 11:25 AM                 Assessment Completed by: Moe Pena    Patient Name: Marshall Martinez     YOB: 1945  Diagnosis: Viral pneumonia [J12.9]     Date / Time: 1/6/2021  3:47 PM    Patient Admission Status: Inpatient    If patient is discharged prior to next notation, then this note serves as note for discharge by case management. Current PCP: Nithya Chavez DO  Clinic Patient: No    Chart Reviewed: Yes  Patient/ Family Interviewed: Yes    Initial assessment completed at bedside with: patient over the phone due to isolation    Hospitalization in the last 30 days: No    Emergency Contacts:  Extended Emergency Contact Information  Primary Emergency Contact: 1 Mtime Phone: [phone]  Work Phone: [phone]  Relation: Child  Secondary Emergency Contact: [address] [employer] Stephanie Phone: [phone]  Work Phone: [phone]  Relation: Child    Advance Directives:   Code Status: Full 2021 Rebekah Miranda y: Yes  Agent: Bernardo Boydjulianne  Contact Number: [phone]    Copy present: No     In paper Chart:   Financial  Payor: Radha Wayne / Plan: Denny Ly ESSENTIAL/PLUS / Product Type: *No Product type* /     Pre-cert required for SNF: Yes    Pharmacy    [facility] [address], [address]  [facility] [address]  Phone: [phone] Fax: [phone]    [facility] [address] [place] [address] [phone] [facility] [phone]  [facility] [address]  Phone: [phone] Fax: [phone]      Potential assistance Purchasing Medications:    Does Patient want to participate in local refill/ meds to beds program?:      Meds To Beds General Rules:  1. Can ONLY be done Monday- Friday between 8:30am-5pm  2. Prescription(s) must be in pharmacy by 3pm to be filled same day  3. Copy of patient's insurance/ prescription drug card and patient face sheet must be sent along with the prescription(s)  4. Cost of Rx cannot be added to hospital bill. If financial assistance is needed, please contact unit  or ;  or  CANNOT provide pharmacy voucher for patients co-pays  5. Patients can then  the prescription on their way out of the hospital at discharge, or pharmacy can deliver to the bedside if staff is available. (payment due at time of pick-up or delivery - cash, check, or card accepted)     Able to afford home medications/ co-pay costs: Yes    ADLS  Support Systems:      PT AM-PAC: 24 /24  OT AM-PAC:   /24    [place]: 2 story  Steps: 3 steps into home    Plans to RETURN to current housing: Yes  Barriers to RETURNING to current housing: none    [address]  Currently ACTIVE with [address]: Yes  [address]: Not Applicable          Durable Medical Equipment  DME Provider: n/a  Equipment: n/a    Home Oxygen and [address] prior to admission: No  [address]: Not Applicable      Dialysis  Active with HD/PD prior to admission: No  Nephrologist:     HD Center:  Not Applicable    DISCHARGE PLAN:  Disposition: Home- No Services Needed    Transportation PLAN for discharge: family     Factors facilitating achievement of predicted outcomes: Family support, Cooperative and Pleasant    Barriers to discharge: Medical complications    Additional Case Management Notes:  Patient is from home with family, independent pta. Patient denies any CM needs at this time. Patient's son in law to transport home at this time.     The Plan for Transition of Care is related to the following treatment goals of Viral pneumonia [J12.9]    The Patient and/or patient representative Kait Chavez and her family were provided with a choice of provider and agrees with the discharge plan Yes    Freedom of choice list was provided with basic dialogue that supports the patient's individualized plan of care/goals and shares the quality data associated with the providers.  Yes    Care Transition patient: No    Moe Pena RN  [employer]  Case Management Department  Ph: [phone]   Fax: [phone]

## 2021-01-07 NOTE — PROGRESS NOTES
shower, Shower chair  Bathroom Accessibility: Accessible  Home Equipment: Cane, Rolling walker, Reacher, Sock aid(does not currently use)  Receives Help From: Family  ADL Assistance: Independent  Homemaking Assistance: Independent  Ambulation Assistance: Independent  Transfer Assistance: Independent  Active : Yes  Occupation: Part time employment  Type of occupation: jbilling  Leisure & Hobbies: reading, painting       Objective   Vision: Impaired  Vision Exceptions: Wears glasses for reading  Hearing: Within functional limits    Orientation  Overall Orientation Status: Within Normal Limits     Balance  Sitting Balance: Independent  Standing Balance: Independent  Standing Balance  Time: ~3 min  Activity: bathroom mobility/activity  Functional Mobility  Functional - Mobility Device: No device  Activity: To/from bathroom  Assist Level: Independent  Toilet Transfers  Toilet - Technique: Ambulating  Equipment Used: Standard toilet  Toilet Transfer: Independent  ADL  Grooming: Independent  LE Dressing: Independent  Toileting: Independent  Tone RUE  RUE Tone: Normotonic  Tone LUE  LUE Tone: Normotonic  Coordination  Movements Are Fluid And Coordinated: Yes     Bed mobility  Supine to Sit: Modified independent  Sit to Supine: Independent  Transfers  Stand Step Transfers: Independent  Sit to stand: Independent  Stand to sit: Independent     Cognition  Overall Cognitive Status: WNL                 LUE AROM (degrees)  LUE AROM : WFL  RUE AROM (degrees)  RUE AROM : WFL  LUE Strength  Gross LUE Strength: WFL  RUE Strength  Gross RUE Strength: WFL     Hand Dominance  Hand Dominance: Right     Treatment included ADL and transfer training.         Plan   Plan  Plan Comment: Discharge pt from acute OT      AM-PAC Score        AM-PAC Inpatient Daily Activity Raw Score: 24 (01/07/21 1209)  AM-PAC Inpatient ADL T-Scale Score : 57.54 (01/07/21 1209)  ADL Inpatient CMS 0-100% Score: 0 (01/07/21 1209)  ADL Inpatient CMS G-Code Modifier : CH (01/07/21 1209)    Goals    No goals indicated       Therapy Time   Individual Concurrent Group Co-treatment   Time In 0900         Time Out 0938         Minutes 38         Timed Code Treatment Minutes: 28 Minutes    Total Treatment [address] OTR/L [id number]

## 2021-01-07 NOTE — ED PROVIDER NOTES
[id number] RESIDENT NOTE     Date of evaluation: 1/6/2021    Diagnostic Results       RADIOLOGY:  XR CHEST PORTABLE   Final Result      Moderate basilar atelectasis/scarring. Patchy opacity left lung base-atelectasis versus pneumonia. Normal cardiomediastinal silhouette.           LABS:   Results for orders placed or performed during the hospital encounter of 01/06/21   CBC Auto Differential   Result Value Ref Range    WBC 9.3 4.0 - 11.0 K/uL    RBC 4.30 4.00 - 5.20 M/uL    Hemoglobin 12.5 12.0 - 16.0 g/dL    Hematocrit 37.4 36.0 - 48.0 %    MCV 86.9 80.0 - 100.0 fL    MCH 29.1 26.0 - 34.0 pg    MCHC 33.5 31.0 - 36.0 g/dL    RDW 14.3 12.4 - 15.4 %    Platelets 941 192 - 458 K/uL    MPV 8.7 5.0 - 10.5 fL    Neutrophils % 81.5 %    Lymphocytes % 8.7 %    Monocytes % 9.5 %    Eosinophils % 0.2 %    Basophils % 0.1 %    Neutrophils Absolute 7.6 1.7 - 7.7 K/uL    Lymphocytes Absolute 0.8 (L) 1.0 - 5.1 K/uL    Monocytes Absolute 0.9 0.0 - 1.3 K/uL    Eosinophils Absolute 0.0 0.0 - 0.6 K/uL    Basophils Absolute 0.0 0.0 - 0.2 K/uL   Basic Metabolic Panel w/ Reflex to MG   Result Value Ref Range    Sodium 136 136 - 145 mmol/L    Potassium reflex Magnesium 4.0 3.5 - 5.1 mmol/L    Chloride 98 (L) 99 - 110 mmol/L    CO2 22 21 - 32 mmol/L    Anion Gap 16 3 - 16    Glucose 187 (H) 70 - 99 mg/dL    BUN 18 7 - 20 mg/dL    CREATININE 0.9 0.6 - 1.2 mg/dL    GFR Non-African American >60 >60    GFR African American >60 >60    Calcium 9.7 8.3 - 10.6 mg/dL   Troponin   Result Value Ref Range    Troponin <0.01 <0.01 ng/mL   Brain Natriuretic Peptide   Result Value Ref Range    Pro-BNP 87 0 - 449 pg/mL   Blood gas, venous (Lab)   Result Value Ref Range    pH, Oswaldo 7.364 7.350 - 7.450    pCO2, Oswaldo 43.1 41.0 - 51.0 mmHg    pO2, Oswaldo 44.2 (H) 25.0 - 40.0 mmHg    HCO3, Venous 24.0 24.0 - 28.0 mmol/L    Base Excess, Oswaldo -0.9 -2.0 - 3.0 mmol/L    O2 Sat, Oswaldo 71 Not established % Carboxyhemoglobin 1.9 (H) 0.0 - 1.5 %    MetHgb, Oswaldo 0.5 0.0 - 1.5 %    TC02 (Calc), Oswaldo 25 mmol/L    Hemoglobin, Oswaldo, Reduced 27.90 %   Lactate, plasma   Result Value Ref Range    Lactic Acid 2.3 (H) 0.4 - 2.0 mmol/L   Procalcitonin   Result Value Ref Range    Procalcitonin 0.16 (H) 0.00 - 0.15 ng/mL   C-Reactive Protein   Result Value Ref Range    .4 (H) 0.0 - 5.1 mg/L   Ferritin   Result Value Ref Range    Ferritin 511.2 (H) 15.0 - 150.0 ng/mL   Lactate Dehydrogenase   Result Value Ref Range     (H) 100 - 190 U/L   Lactic Acid, Plasma   Result Value Ref Range    Lactic Acid 1.1 0.4 - 2.0 mmol/L   POCT Glucose   Result Value Ref Range    POC Glucose 263 (H) 70 - 99 mg/dl    Performed on ACCU-CHEK    EKG 12 Lead   Result Value Ref Range    Ventricular Rate 82 BPM    Atrial Rate 82 BPM    P-R Interval 176 ms    QRS Duration 92 ms    Q-T Interval 396 ms    QTc Calculation (Bazett) 462 ms    P Axis 41 degrees    R Axis -14 degrees    T Axis 52 degrees    Diagnosis       EKG performed in ER and to be interpreted by ER physician. Confirmed by MD, ER ([id number]),  Christohper PRYOR 15 ([phone]) on 1/6/2021 6:11:32 PM       RECENT VITALS:  BP: 133/74, Temp: 97.1 °F (36.2 °C), Pulse: 74, Resp: 18, SpO2: 95 %     Procedures     None    ED Course     The patient was given the following medications:  Orders Placed This Encounter   Medications    dexamethasone (DECADRON) injection 6 mg    albuterol sulfate  (90 Base) MCG/ACT inhaler 2 puff    aspirin chewable tablet 81 mg    lisinopril (PRINIVIL;ZESTRIL) tablet 5 mg    rosuvastatin (CRESTOR) tablet 20 mg    sertraline (ZOLOFT) tablet 25 mg    sodium chloride flush 0.9 % injection 10 mL    sodium chloride flush 0.9 % injection 10 mL    enoxaparin (LOVENOX) injection 30 mg    OR Linked Order Group     promethazine (PHENERGAN) tablet 12.5 mg     ondansetron (ZOFRAN) injection 4 mg    polyethylene glycol (GLYCOLAX) packet 17 g    OR Linked Order Group    Lotus Gaston acetaminophen (TYLENOL) tablet 650 mg     acetaminophen (TYLENOL) suppository 650 mg    pantoprazole (PROTONIX) tablet 40 mg    guaiFENesin-dextromethorphan (ROBITUSSIN DM) 100-10 MG/5ML syrup 5 mL    Vitamin D (CHOLECALCIFEROL) tablet 2,000 Units    dexamethasone (DECADRON) tablet 6 mg    insulin lispro (1 Unit Dial) 0-6 Units    insulin lispro (1 Unit Dial) 0-3 Units    glucose (GLUTOSE) 40 % oral gel 15 g    dextrose 50 % IV solution    glucagon (rDNA) injection 1 mg    dextrose 5 % solution    budesonide-formoterol (SYMBICORT) 80-4.5 MCG/ACT inhaler 1 puff       CONSULTS:  IP CONSULT TO HOSPITALIST  IP CONSULT TO CASE MANAGEMENT    MEDICAL DECISION MAKING / ASSESSMENT / Michelle Higinio is a 66 y.o. female patient who presented to the emergency department on 1/6/2021. This patient was initially seen by an off-going provider. Please see that provider's note for details regarding the initial history, physical exam and ED course. Care of this patient was signed out to me. At the time of turnover the following steps in the patient's evaluation were pending:   - admission to hospitalist    Briefly, this is a 66 y.o. female with past medical history significant for diabetes, hypertension, hyperlipidemia who presents for evaluation of shortness of breath. Initial history concerning for 3 days of upper respiratory symptoms including cough, sneezing, runny nose. Home pulse oximeter was 84% on room air. Here on room air, patient is saturating 83%, requiring 3 L nasal cannula to saturate in the mid 90s. On my initial evaluation, the patient was in no acute respiratory distress, saturating mid 90s on 3L NC. At this time the patient has been admitted to hospitalist for further evaluation and management of likely COVID pneumonia. The patient will continue to be monitored here in the emergency department until which time she is moved to her new treatment location.     This patient was also evaluated by the attending physician. All care plans were discussed and agreed upon. Clinical Impression     1. Hypoxia    2. Suspected COVID-19 virus infection        Disposition     PATIENT REFERRED TO:  No follow-up provider specified.     DISCHARGE MEDICATIONS:  Current Discharge Medication List          DISPOSITION Admitted 01/06/2021 07:40:38 PM      Nissa Hobbs MD   Emergency Medicine, PGY-2     Woody Tovar MD  Resident  01/06/21 [id number]

## 2021-01-07 NOTE — PROGRESS NOTES
Patient admitted to Rm. 4304 for SOB. Report from Khushboo Manzano, [facility]. Alert & orientedx4. VSS. Currently on 3L O2. No c/o pain. Belongings and valuables remain with patient. Patient instructed to use call light before getting out of bed; verbalized understanding. Questions/concerns addressed at this time. Call light within reach. Bed alarm on. Bed low and in locked position. Will continue to monitor and assess patient needs.

## 2021-01-07 NOTE — PROGRESS NOTES
RESPIRATORY THERAPY ASSESSMENT    Name:  Emigdio Kunz Record Number:  [id number]  Age: 66 y.o. Gender: female  : 1945  Today's Date:  2021  Room:  [facility]7660-    Assessment     Is the patient being admitted for a COPD or Asthma exacerbation? No   (If yes the patient will be seen every 4 hours for the first 24 hours and then reassessed)    Patient Admission Diagnosis      Allergies  Allergies   Allergen Reactions    Codeine Other (See Comments)     headache    Statins Depletion Therapy      myalgia     Pulmonary History:No history  Home Oxygen Therapy:  room air  Home Respiratory Therapy:Budesonide/Formoterol    Current Respiratory Therapy:  symbicort daily          Respiratory Severity Index(RSI)   Patients with orders for inhalation medications, oxygen, or any therapeutic treatment modality will be placed on Respiratory Protocol. They will be assessed with the first treatment and at least every 72 hours thereafter. The following severity scale will be used to determine frequency of treatment intervention.     Smoking History: Pulmonary Disease or Smoking History, Greater than 15 pack year = 2    Social History  Social History     Tobacco Use    Smoking status: Former Smoker     Quit date: 1990     Years since quittin.0   Substance Use Topics    Alcohol use: Yes     Comment: rare    Drug use: No       Recent Surgical History: None = 0  Past Surgical History  Past Surgical History:   Procedure Laterality Date    CHOLECYSTECTOMY, LAPAROSCOPIC  [id number]    LAPAROSCOPIC CHOLECYSTECTOMY WITH CHOLANGIOGRAM    DILATION AND CURETTAGE OF UTERUS      EYE SURGERY      narrow angle glaucomma, surgery 2017    HYSTERECTOMY      JOINT REPLACEMENT      right hip replacement, Dr. Anson Burger        Level of Consciousness: Alert, Oriented, and Cooperative = 0    Level of Activity: Walking unassisted = 0    Respiratory Pattern: Regular Pattern; RR 8-20 = 0    Breath Sounds: Diminished unilaterally = 1    Sputum   ,  ,    Cough: Strong, spontaneous, non-productive = 0    Vital Signs   /74   Pulse 74   Temp 97.1 °F (36.2 °C) (Oral)   Resp 18   Wt 193 lb (87.5 kg)   SpO2 95%   BMI 33.13 kg/m²   SPO2 (COPD values may differ): 86-87% on room air or greater than 92% on FiO2 35- 50% = 3    Peak Flow (asthma only): not applicable = 0    RSI: 5-6 = Q4hr PRN (every four hours as needed) for dyspnea        Plan       Goals: medication delivery, mobilize retained secretions, volume expansion and improve oxygenation    Patient/caregiver was educated on the proper method of use for Respiratory Care Devices:  Yes      Level of patient/caregiver understanding able to:   ? Verbalize understanding   ? Demonstrate understanding       ? Teach back        ? Needs reinforcement       ? No available caregiver               ? Other:     Response to education:  Excellent     Is patient being placed on Home Treatment Regimen? No     Does the patient have everything they need prior to discharge? NA     Comments: PT in no resp distress    Plan of Care: symbicort daily    Electronically signed by Lakshmi Carroll RCP on 1/6/2021 at 11:07 PM    Respiratory Protocol Guidelines     1. Assessment and treatment by Respiratory Therapy will be initiated for medication and therapeutic interventions upon initiation of aerosolized medication. 2. Physician will be contacted for respiratory rate (RR) greater than 35 breaths per minute. Therapy will be held for heart rate (HR) greater than 140 beats per minute, pending direction from physician. 3. Bronchodilators will be administered via Metered Dose Inhaler (MDI) with spacer when the following criteria are met:  a. Alert and cooperative     b. HR < 140 bpm  c. RR < 30 bpm                d. Can demonstrate a 2-3 second inspiratory hold  4.  Bronchodilators will be administered via Hand Held Nebulizer [facility]) to patients when ANY of the following criteria are met  a. Incognizant or uncooperative          b. Patients treated with HHN at Home        c. Unable to demonstrate proper use of MDI with spacer     d. RR > 30 bpm   5. Bronchodilators will be delivered via Metered Dose Inhaler (MDI), HHN, Aerogen to intubated patients on mechanical ventilation. 6. Inhalation medication orders will be delivered and/or substituted as outlined below. Aerosolized Medications Ordering and Administration Guidelines:    1. All Medications will be ordered by a physician, and their frequency and/or modality will be adjusted as defined by the patients Respiratory Severity Index (RSI) score. 2. If the patient does not have documented COPD, consider discontinuing anticholinergics when RSI is less than 9.  3. If the bronchospasm worsens (increased RSI), then the bronchodilator frequency can be increased to a maximum of every 4 hours. If greater than every 4 hours is required, the physician will be contacted. 4. If the bronchospasm improves, the frequency of the bronchodilator can be decreased, based on the patient's RSI, but not less than home treatment regimen frequency. 5. Bronchodilator(s) will be discontinued if patient has a RSI less than 9 and has received no scheduled or as needed treatment for 72  Hrs. Patients Ordered on a Mucolytic Agent:    1. Must always be administered with a bronchodilator. 2. Discontinue if patient experiences worsened bronchospasm, or secretions have lessened to the point that the patient is able to clear them with a cough. Anti-inflammatory and Combination Medications:    1. If the patient lacks prior history of lung disease, is not using inhaled anti-inflammatory medication at home, and lacks wheezing by examination or by history for at least 24 hours, contact physician for possible discontinuation.

## 2021-01-07 NOTE — PROGRESS NOTES
Patient had uneventful night. Alert & oriented x4. VSS. Remains on 3L O2. Dyspnea on exertion. No c/o pain. COVID results pending. Call light within reach. Bed alarm on. Bed low and in locked position. Will continue to monitor and assess patient needs.

## 2021-01-07 NOTE — H&P
Internal Medicine PGY-1 Resident History & Physical      PCP: Nithya Chavez DO    Date of Admission: 1/6/2021    Date of Service: Pt seen/examined on 1/6/2021 and Admitted to Inpatient with expected LOS greater than two midnights due to medical therapy. Chief Complaint:  SOB      History Of Present Illness:     Marshall Martinez is a 66 y.o. female with a PMHx of Obesity, DMII, HTN, HLD, who p/w shortness of breath since Sunday. Patient also endorses chest soreness from her occasional nonproductive cough. Patient states that she usually takes the staircase often and has no difficulty with that however for the past few days she has been winded when she reaches the top of her staircase. She states that since she takes MiraLAX daily her stool is always soft but denies diarrhea. She otherwise denies any fever, chills, nausea, vomiting, anosmia, ageusia, chest pain,, palpitations, orthopnea, PND, abdominal pain, dysuria, constipation. 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 She did note that yesterday her daughter whom she lives with tested positive for Covid. They both are unsure where they might of contracted it. In the ED, patient arrived with stable vital signs, afebrile, satting 83% on room air with improvement to 95% on 3 L of nasal cannula. Labs significant for lactate of 2.3, pro calcitonin 0.16, negative proBNP and troponin, glucose 187, normal WBC of 9.3 and hemoglobin 12.5. EKG normal sinus rhythm. Chest x-ray with patchy opacity in the left lung base-atelectasis versus pneumonia. Patient was given a dose of Decadron in the ED.     Past Medical History:          Diagnosis Date    Arthritis     right hip    Diabetes mellitus ([facility] [address]     Hyperlipidemia     Hypertension     Nausea & vomiting     Obese     Spinal stenosis        Past Surgical History:          Procedure Laterality Date   Kianna Land, [address] [place] [address] LAPAROSCOPIC CHOLECYSTECTOMY WITH CHOLANGIOGRAM    DILATION AND CURETTAGE OF UTERUS      EYE SURGERY      narrow angle glaucomma, surgery 09/25/2017    HYSTERECTOMY      JOINT REPLACEMENT      right hip replacement, Dr. Marie Cough        Medications Prior to Admission:      Prior to Admission medications    Medication Sig Start Date End Date Taking? Authorizing Provider   metFORMIN (GLUCOPHAGE-XR) 500 MG extended release tablet Take 1,000 mg by mouth 2 times daily (with meals)   Yes Historical Provider, MD   albuterol sulfate HFA (VENTOLIN HFA) 108 (90 Base) MCG/ACT inhaler Inhale 2 puffs into the lungs every 6 hours as needed for Wheezing   Yes Historical Provider, MD   Ciclopirox 1 % SHAM Apply topically daily Apply to affected toenails   Yes Historical Provider, MD   rosuvastatin (CRESTOR) 20 MG tablet Take 20 mg by mouth nightly   Yes Historical Provider, MD   sertraline (ZOLOFT) 25 MG tablet Take 25 mg by mouth daily   Yes Historical Provider, MD   lisinopril (PRINIVIL;ZESTRIL) 5 MG tablet Take 5 mg by mouth daily    Yes Historical Provider, MD   Vitamin D (CHOLECALCIFEROL) 1000 UNIT CAPS capsule Take 1,000 Units by mouth daily. Yes Historical Provider, MD   aspirin 81 MG chewable tablet Take 81 mg by mouth daily    Yes Historical Provider, MD   Multiple Vitamins-Minerals (PX SENIOR VITAMIN PO) Take  by mouth. Historical Provider, MD       Allergies:  Codeine and Statins depletion therapy    Social History:      The patient currently lives at home with her daughter son-in-law and granddaughter. 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 TOBACCO:   reports that she quit smoking about 30 years ago. She does not have any smokeless tobacco history on file. ETOH:  reports current alcohol use. Family History:     History reviewed. No pertinent family history. REVIEW OF SYSTEMS: Pertinent positives as noted in the HPI. All other systems reviewed and negative.   ROS: Review of Systems Constitutional: Negative. HENT: Negative. Eyes: Negative. Respiratory: Negative. Cardiovascular: Negative. Gastrointestinal: Negative. Genitourinary: Negative. Musculoskeletal: Negative. Skin: Negative. Neurological: Negative. Psychiatric/Behavioral: Negative. PHYSICAL EXAM PERFORMED:    BP (!) 144/78   Pulse 80   Temp 98.2 °F (36.8 °C) (Oral)   Resp 30   Wt 193 lb (87.5 kg)   SpO2 95%   BMI 33.13 kg/m²     General appearance:  No apparent distress, appears stated age and cooperative. HEENT:  Normal cephalic,atraumatic without obvious deformity. Pupils equal, round, and reactive to light. Extra ocular muscles intact. Conjunctivae/corneas clear. Neck: Supple, with full range of motion. No jugular venous distention. Trachea midline. Respiratory:  Normal respiratory effort. Diffuse bilateral crackles, bilaterally without Wheezes  Cardiovascular:  Regular rate and rhythm with normal S1/S2 without murmurs, rubs or gallops. Abdomen: Soft, non-tender, non-distended with normal bowel sounds. Musculoskeletal:  No clubbing, cyanosis oredema bilaterally. Full range of motion without deformity. Trace pitting edema in the bilateral lower extremities  Skin: Skin color, texture, turgor normal.  Norashes or lesions. Neurologic:  Neurovascularly intact without any focal sensory/motor deficits. Cranialnerves: II-XII intact, grossly non-focal.  Psychiatric:  Alert and oriented, thought content appropriate,normal insight  Capillary Refill: Brisk,< 3 seconds   Peripheral Pulses: +2 palpable, equal bilaterally       Labs:     Recent Labs     01/06/21  1639   WBC 9.3   HGB 12.5   HCT 37.4        Recent Labs     01/06/21  1649      K 4.0   CL 98*   CO2 22   BUN 18   CREATININE 0.9   CALCIUM 9.7     No results for input(s): AST, ALT, BILIDIR, BILITOT, ALKPHOS in the last 72 hours. No results for input(s): INR in the last 72 hours.   Recent Labs     01/06/21  1649 TROPONINI <0.01       Urinalysis:    No results found for: Mau Agent, BACTERIA, RBCUA, BLOODU, SPECGRAV, GLUCOSEU    Radiology:     XR CHEST PORTABLE   Final Result      Moderate basilar atelectasis/scarring. Patchy opacity left lung base-atelectasis versus pneumonia. Normal cardiomediastinal silhouette. ASSESSMENT & PLAN:  Miryam Crawford is a 66 y.o. female w/ PMHx of Obesity, DMII, HTN, HLD, who p/w shortness of breath since Sunday. Acute hypoxic respiratory failure likely 2/2 COVID-19 PNA  Obese, DM, elderly, satting 83% on RA, afebrile, 3LNC, CXR with patchy opacity left lung base-atelectasis versus pneumonia.  - COVID PCR pending  - Inflammatory markers pending  - f/u strep pneumo antigen, Legionella, respiratory culture  - Procal 0.16  - Lovenox 30mg BID  - Decadron 6mg for 10 days  - Maintain SpO2 > 88%  - Will continue to follow closely    DMII  Holding home Metformin.  LA 2.3  - LD SSI  - POCT before every meal, nightly  - Hypoglycemia protocol  - f/u A1C    Elevated lactate  2.3  - Will f/u repeat LA    HTN  - Continue home lisinopril 10 mg daily    HLD  - Continue Crestor 20 mg nightly      DVT Prophylaxis: Lovenox 30mg BID  Diet: No diet orders on file  Code Status: No Order  PT/OT Eval Status: Pending  Dispo - [facility]    I will discuss the patient with the senior resident and Dr Angela Frank MD  Internal Medicine Resident, PGY-1

## 2021-01-07 NOTE — ED NOTES
Attempted to call report to floor, patient not assigned to RN at this time.  Will call back for report, admitting team at bedside     Nick Frye RN  01/06/21 1959

## 2021-01-08 LAB
A/G RATIO: 1 (ref 1.1–2.2)
ALBUMIN SERPL-MCNC: 3.7 G/DL (ref 3.4–5)
ALP BLD-CCNC: 63 U/L (ref 40–129)
ALT SERPL-CCNC: 22 U/L (ref 10–40)
ANION GAP SERPL CALCULATED.3IONS-SCNC: 14 MMOL/L (ref 3–16)
AST SERPL-CCNC: 25 U/L (ref 15–37)
BASOPHILS ABSOLUTE: 0 K/UL (ref 0–0.2)
BASOPHILS RELATIVE PERCENT: 0.3 %
BILIRUB SERPL-MCNC: <0.2 MG/DL (ref 0–1)
BUN BLDV-MCNC: 28 MG/DL (ref 7–20)
CALCIUM SERPL-MCNC: 9.7 MG/DL (ref 8.3–10.6)
CHLORIDE BLD-SCNC: 101 MMOL/L (ref 99–110)
CO2: 21 MMOL/L (ref 21–32)
CREAT SERPL-MCNC: 1 MG/DL (ref 0.6–1.2)
D DIMER: 291 NG/ML DDU (ref 0–229)
EOSINOPHILS ABSOLUTE: 0 K/UL (ref 0–0.6)
EOSINOPHILS RELATIVE PERCENT: 0.1 %
ESTIMATED AVERAGE GLUCOSE: 174.3 MG/DL
FIBRINOGEN: 502 MG/DL (ref 200–397)
GFR AFRICAN AMERICAN: >60
GFR NON-AFRICAN AMERICAN: 54
GLOBULIN: 3.6 G/DL
GLUCOSE BLD-MCNC: 215 MG/DL (ref 70–99)
GLUCOSE BLD-MCNC: 226 MG/DL (ref 70–99)
GLUCOSE BLD-MCNC: 273 MG/DL (ref 70–99)
GLUCOSE BLD-MCNC: 304 MG/DL (ref 70–99)
GLUCOSE BLD-MCNC: 344 MG/DL (ref 70–99)
HBA1C MFR BLD: 7.7 %
HCT VFR BLD CALC: 34.6 % (ref 36–48)
HEMOGLOBIN: 11.6 G/DL (ref 12–16)
LYMPHOCYTES ABSOLUTE: 1.4 K/UL (ref 1–5.1)
LYMPHOCYTES RELATIVE PERCENT: 13.5 %
MCH RBC QN AUTO: 29 PG (ref 26–34)
MCHC RBC AUTO-ENTMCNC: 33.7 G/DL (ref 31–36)
MCV RBC AUTO: 86.3 FL (ref 80–100)
MONOCYTES ABSOLUTE: 0.9 K/UL (ref 0–1.3)
MONOCYTES RELATIVE PERCENT: 9.2 %
NEUTROPHILS ABSOLUTE: 7.9 K/UL (ref 1.7–7.7)
NEUTROPHILS RELATIVE PERCENT: 76.9 %
PDW BLD-RTO: 13.8 % (ref 12.4–15.4)
PERFORMED ON: ABNORMAL
PLATELET # BLD: 232 K/UL (ref 135–450)
PMV BLD AUTO: 9.3 FL (ref 5–10.5)
POTASSIUM REFLEX MAGNESIUM: 4 MMOL/L (ref 3.5–5.1)
RBC # BLD: 4.01 M/UL (ref 4–5.2)
SODIUM BLD-SCNC: 136 MMOL/L (ref 136–145)
TOTAL PROTEIN: 7.3 G/DL (ref 6.4–8.2)
WBC # BLD: 10.3 K/UL (ref 4–11)

## 2021-01-08 PROCEDURE — XW033E5 INTRODUCTION OF REMDESIVIR ANTI-INFECTIVE INTO PERIPHERAL VEIN, PERCUTANEOUS APPROACH, NEW TECHNOLOGY GROUP 5: ICD-10-PCS | Performed by: INTERNAL MEDICINE

## 2021-01-08 PROCEDURE — 2700000000 HC OXYGEN THERAPY PER DAY

## 2021-01-08 PROCEDURE — 2580000003 HC RX 258: Performed by: INTERNAL MEDICINE

## 2021-01-08 PROCEDURE — 6360000002 HC RX W HCPCS: Performed by: STUDENT IN AN ORGANIZED HEALTH CARE EDUCATION/TRAINING PROGRAM

## 2021-01-08 PROCEDURE — 6370000000 HC RX 637 (ALT 250 FOR IP): Performed by: STUDENT IN AN ORGANIZED HEALTH CARE EDUCATION/TRAINING PROGRAM

## 2021-01-08 PROCEDURE — 94640 AIRWAY INHALATION TREATMENT: CPT

## 2021-01-08 PROCEDURE — 85379 FIBRIN DEGRADATION QUANT: CPT

## 2021-01-08 PROCEDURE — 94761 N-INVAS EAR/PLS OXIMETRY MLT: CPT

## 2021-01-08 PROCEDURE — 94680 O2 UPTK RST&XERS DIR SIMPLE: CPT

## 2021-01-08 PROCEDURE — 2580000003 HC RX 258: Performed by: STUDENT IN AN ORGANIZED HEALTH CARE EDUCATION/TRAINING PROGRAM

## 2021-01-08 PROCEDURE — 2500000003 HC RX 250 WO HCPCS: Performed by: INTERNAL MEDICINE

## 2021-01-08 PROCEDURE — 1200000000 HC SEMI PRIVATE

## 2021-01-08 PROCEDURE — 85025 COMPLETE CBC W/AUTO DIFF WBC: CPT

## 2021-01-08 PROCEDURE — 85384 FIBRINOGEN ACTIVITY: CPT

## 2021-01-08 PROCEDURE — 80053 COMPREHEN METABOLIC PANEL: CPT

## 2021-01-08 RX ORDER — 0.9 % SODIUM CHLORIDE 0.9 %
30 INTRAVENOUS SOLUTION INTRAVENOUS PRN
Status: DISCONTINUED | OUTPATIENT
Start: 2021-01-08 | End: 2021-01-09 | Stop reason: HOSPADM

## 2021-01-08 RX ADMIN — INSULIN LISPRO 2 UNITS: 100 INJECTION, SOLUTION INTRAVENOUS; SUBCUTANEOUS at 09:29

## 2021-01-08 RX ADMIN — INSULIN LISPRO 4 UNITS: 100 INJECTION, SOLUTION INTRAVENOUS; SUBCUTANEOUS at 13:16

## 2021-01-08 RX ADMIN — Medication 2000 UNITS: at 08:00

## 2021-01-08 RX ADMIN — Medication 10 ML: at 08:01

## 2021-01-08 RX ADMIN — Medication 10 ML: at 20:10

## 2021-01-08 RX ADMIN — REMDESIVIR 200 MG: 100 INJECTION, POWDER, LYOPHILIZED, FOR SOLUTION INTRAVENOUS at 10:16

## 2021-01-08 RX ADMIN — INSULIN LISPRO 1 UNITS: 100 INJECTION, SOLUTION INTRAVENOUS; SUBCUTANEOUS at 21:54

## 2021-01-08 RX ADMIN — BUDESONIDE AND FORMOTEROL FUMARATE DIHYDRATE 1 PUFF: 80; 4.5 AEROSOL RESPIRATORY (INHALATION) at 20:11

## 2021-01-08 RX ADMIN — ASPIRIN 81 MG: 81 TABLET, CHEWABLE ORAL at 08:00

## 2021-01-08 RX ADMIN — PANTOPRAZOLE SODIUM 40 MG: 40 TABLET, DELAYED RELEASE ORAL at 06:01

## 2021-01-08 RX ADMIN — DEXAMETHASONE 6 MG: 4 TABLET ORAL at 08:00

## 2021-01-08 RX ADMIN — INSULIN LISPRO 4 UNITS: 100 INJECTION, SOLUTION INTRAVENOUS; SUBCUTANEOUS at 19:20

## 2021-01-08 RX ADMIN — ENOXAPARIN SODIUM 40 MG: 40 INJECTION SUBCUTANEOUS at 20:10

## 2021-01-08 RX ADMIN — SODIUM CHLORIDE 30 ML: 900 INJECTION, SOLUTION INTRAVENOUS at 10:16

## 2021-01-08 RX ADMIN — SERTRALINE HYDROCHLORIDE 25 MG: 25 TABLET ORAL at 08:00

## 2021-01-08 RX ADMIN — BUDESONIDE AND FORMOTEROL FUMARATE DIHYDRATE 1 PUFF: 80; 4.5 AEROSOL RESPIRATORY (INHALATION) at 07:41

## 2021-01-08 RX ADMIN — ENOXAPARIN SODIUM 40 MG: 40 INJECTION SUBCUTANEOUS at 08:00

## 2021-01-08 RX ADMIN — ROSUVASTATIN CALCIUM 20 MG: 20 TABLET, COATED ORAL at 20:10

## 2021-01-08 RX ADMIN — LISINOPRIL 5 MG: 5 TABLET ORAL at 08:00

## 2021-01-08 ASSESSMENT — ENCOUNTER SYMPTOMS
NAUSEA: 0
DIARRHEA: 0
COUGH: 1
WHEEZING: 0
SHORTNESS OF BREATH: 1
ABDOMINAL PAIN: 0

## 2021-01-08 NOTE — PROGRESS NOTES
Patient alert and oriented x4. Remains on 3 L O2 and has dyspnea on exertion. Ambulating independently. No c/o pain. VSS. Denies needs at this time. Will continue to monitor.

## 2021-01-08 NOTE — PROGRESS NOTES
[facility]    Respiratory Therapy     Home Oxygen Evaluation        Name: Emigdio Kunz Record Number: [id number]  Age: 66 y.o. Gender:  female   : 1945  Today's date: 2021  Room: [address] [place] [address]      Assessment        /69   Pulse 66   Temp 97.1 °F (36.2 °C) (Oral)   Resp 18   Ht 5' 4.02\" (1.626 m)   Wt 193 lb (87.5 kg)   SpO2 94%   BMI 33.11 kg/m²     Patient Active Problem List   Diagnosis    Viral pneumonia    Hypoxia    DM (diabetes mellitus), type 2, uncontrolled ([address]    Essential hypertension       Social History:  Social History     Tobacco Use    Smoking status: Former Smoker     Quit date: 1990     Years since quittin.0   Substance Use Topics    Alcohol use: Yes     Comment: rare    Drug use: No       Patient Room Air saturation at rest 91  %  Patient Room Air saturation upon ambulation 87 %    Oxygen saturations of 88% or less on RA qualifies patient for Home Oxygen    Patient resting on 1  lmp  with an oxygen saturation of  94 %     Patient ambulated on 1 lpm with an oxygen saturation of 90%    Qualifying patient for home oxygen with ambulation and continuous flow  @ 1 lpm.      In your clinical assessment does the Patient Require Portable Oxygen Tanks?     Yes               Patient/caregiver was educated on Home Oxygen process:  Yes      Level of patient/caregiver understanding able to:   [x] Verbalize understanding   [] Demonstrate understanding       [] Teach back        [] Needs reinforcement        []  No available caregiver               []  Other:     Response to education:  Excellent     Time Spent with Home O2 Set Up:  5  minutes     José Miguel Gurrola RCP on 2021 at 12:26 PM                 .

## 2021-01-08 NOTE — ACP (ADVANCE CARE PLANNING)
Advance Care Planning     Advance Care Planning Activator (Inpatient)  Conversation Note      Date of ACP Conversation: 1/6/2021    Conversation Conducted with: \"Patient with Decision Making Capacity\"}    ACP Activator: Terri Hand    *When Decision Maker makes decisions on behalf of the incapacitated patient: Decision Maker is asked to consider and make decisions based on patient values, known preferences, or best interests. Health Care Decision Maker:     Current Designated Health Care Decision Maker:   Primary Decision Maker: Brett Blackwell - Child - [phone]    Secondary Decision Maker: Vonne Bernheim - Other - [phone]  (If there is a valid Health Care Decision Maker named in the [address] [employer]\" box in the ACP activity, but it is not visible above, be sure to open that field and then select the health care decision maker relationship (ie \"primary\") in the blank space to the right of the name.) Validate  this information as still accurate & up-to-date; edit [place] field as needed.)    Note: Assess and validate information in current ACP documents, as indicated. Daughter Demetri Cantu instructed to fax AD's to us. If no Decision Maker listed above or available through scanned documents, then:    If no Authorized Decision Maker has previously been identified, then patient chooses [place]:  \"Who would you like to name as your primary health care decision-maker? \"               Name: Demetri Cantu        Relationship: daughter          Phone number: [phone]  Hernesto Erps this person be reached easily? \" yes  \"Who would you like to name as your back-up decision maker? \"   Name: Haritha Daniel        Relationship: son-in-law         Phone number: [phone]  Hernesto Erps this person be reached easily? \" yes    Note: If the relationship of these Decision-Makers to the patient does NOT follow your state's Next of Kin hierarchy, recommend that patient complete ACP document that meets state-specific requirements to allow them to act on the patient's behalf when appropriate. Care Preferences    Ventilation: \"If you were in your present state of health and suddenly became very ill and were unable to breathe on your own, what would your preference be about the use of a ventilator (breathing machine) if it were available to you? \"      Would the patient desire the use of ventilator (breathing machine)?: yes    \"If your health worsens and it becomes clear that your chance of recovery is unlikely, what would your preference be about the use of a ventilator (breathing machine) if it were available to you? \"     Would the patient desire the use of ventilator (breathing machine)?: no      Resuscitation  \"CPR works best to restart the heart when there is a sudden event, like a heart attack, in someone who is otherwise healthy. Unfortunately, CPR does not typically restart the heart for people who have serious health conditions or who are very sick. \"    \"In the event your heart stopped as a result of an underlying serious health condition, would you want attempts to be made to restart your heart (answer \"yes\" for attempt to resuscitate) or would you prefer a natural death (answer \"no\" for do not attempt to resuscitate)? \" yes      NOTE: If the patient has a valid advance directive AND now provides care preference(s) that are inconsistent with that prior directive, advise the patient to consider either: creating a new advance directive that complies with state-specific requirements; or, if that is not possible, orally revoking that prior directive in accordance with state-specific requirements, which must be documented in the EHR. [] Yes   [x] No   Educated Patient / Lilia Cooley regarding differences between Advance Directives and portable DNR orders.     Length of ACP Conversation in minutes:  15    Conversation Outcomes:  [x] ACP discussion completed  [] Existing advance

## 2021-01-08 NOTE — PROGRESS NOTES
Progress Note    Admit Date: 1/6/2021  Day: 3  Diet: DIET CARB CONTROL;    CC: dyspnea    Interval history:   NAEO. - pt still has mild dyspnea, but denies chest pain and cough   - afebrile, oxygen weaned off to room air on rest but requires oxygen on ambulation   - denies fever, chills, diarrhea, cest pain, anosmia, n/v, abd pain, dysuria, headache. Medications:     Scheduled Meds:   insulin glargine  20 Units Subcutaneous Daily    [START ON 1/9/2021] remdesivir IVPB  100 mg Intravenous Q24H    enoxaparin  40 mg Subcutaneous BID    aspirin  81 mg Oral Daily    lisinopril  5 mg Oral Daily    rosuvastatin  20 mg Oral Nightly    sertraline  25 mg Oral Daily    sodium chloride flush  10 mL Intravenous 2 times per day    pantoprazole  40 mg Oral QAM AC    Vitamin D  2,000 Units Oral Daily    dexamethasone  6 mg Oral Daily    insulin lispro  0-6 Units Subcutaneous TID WC    insulin lispro  0-3 Units Subcutaneous Nightly    budesonide-formoterol  1 puff Inhalation Daily     Continuous Infusions:   dextrose       PRN Meds:sodium chloride, albuterol sulfate HFA, sodium chloride flush, promethazine **OR** ondansetron, polyethylene glycol, acetaminophen **OR** acetaminophen, guaiFENesin-dextromethorphan, glucose, dextrose, glucagon (rDNA), dextrose    Objective:   Vitals:   T-max:  Patient Vitals for the past 8 hrs:   BP Temp Temp src Pulse Resp SpO2   01/08/21 1139 127/69 97.1 °F (36.2 °C) Oral 66 18 94 %   01/08/21 0756 136/68 97 °F (36.1 °C) Oral 66 18 94 %   01/08/21 0741 -- -- -- -- 18 93 %       Intake/Output Summary (Last 24 hours) at 1/8/2021 1447  Last data filed at 1/7/2021 2238  Gross per 24 hour   Intake 360 ml   Output --   Net 360 ml       Review of Systems   Constitutional: Negative for fatigue and fever. Respiratory: Positive for cough and shortness of breath. Negative for wheezing. Cardiovascular: Negative for chest pain, palpitations and leg swelling.    Gastrointestinal: Negative for

## 2021-01-09 VITALS
WEIGHT: 193 LBS | SYSTOLIC BLOOD PRESSURE: 138 MMHG | HEART RATE: 65 BPM | RESPIRATION RATE: 16 BRPM | DIASTOLIC BLOOD PRESSURE: 71 MMHG | HEIGHT: 64 IN | OXYGEN SATURATION: 93 % | BODY MASS INDEX: 32.95 KG/M2 | TEMPERATURE: 97.5 F

## 2021-01-09 LAB
A/G RATIO: 1.3 (ref 1.1–2.2)
ALBUMIN SERPL-MCNC: 3.9 G/DL (ref 3.4–5)
ALP BLD-CCNC: 60 U/L (ref 40–129)
ALT SERPL-CCNC: 29 U/L (ref 10–40)
ANION GAP SERPL CALCULATED.3IONS-SCNC: 12 MMOL/L (ref 3–16)
AST SERPL-CCNC: 28 U/L (ref 15–37)
BANDED NEUTROPHILS RELATIVE PERCENT: 1 % (ref 0–7)
BASOPHILS ABSOLUTE: 0 K/UL (ref 0–0.2)
BASOPHILS RELATIVE PERCENT: 0 %
BILIRUB SERPL-MCNC: <0.2 MG/DL (ref 0–1)
BUN BLDV-MCNC: 21 MG/DL (ref 7–20)
CALCIUM SERPL-MCNC: 9.4 MG/DL (ref 8.3–10.6)
CHLORIDE BLD-SCNC: 106 MMOL/L (ref 99–110)
CO2: 23 MMOL/L (ref 21–32)
CREAT SERPL-MCNC: 0.7 MG/DL (ref 0.6–1.2)
D DIMER: 258 NG/ML DDU (ref 0–229)
EOSINOPHILS ABSOLUTE: 0 K/UL (ref 0–0.6)
EOSINOPHILS RELATIVE PERCENT: 0 %
FIBRINOGEN: 427 MG/DL (ref 200–397)
GFR AFRICAN AMERICAN: >60
GFR NON-AFRICAN AMERICAN: >60
GLOBULIN: 3 G/DL
GLUCOSE BLD-MCNC: 173 MG/DL (ref 70–99)
GLUCOSE BLD-MCNC: 181 MG/DL (ref 70–99)
GLUCOSE BLD-MCNC: 245 MG/DL (ref 70–99)
HCT VFR BLD CALC: 33.5 % (ref 36–48)
HEMOGLOBIN: 11.6 G/DL (ref 12–16)
LYMPHOCYTES ABSOLUTE: 1.7 K/UL (ref 1–5.1)
LYMPHOCYTES RELATIVE PERCENT: 18 %
MCH RBC QN AUTO: 29.6 PG (ref 26–34)
MCHC RBC AUTO-ENTMCNC: 34.7 G/DL (ref 31–36)
MCV RBC AUTO: 85.3 FL (ref 80–100)
METAMYELOCYTES RELATIVE PERCENT: 1 %
MONOCYTES ABSOLUTE: 0.8 K/UL (ref 0–1.3)
MONOCYTES RELATIVE PERCENT: 8 %
MYELOCYTE PERCENT: 2 %
NEUTROPHILS ABSOLUTE: 7.1 K/UL (ref 1.7–7.7)
NEUTROPHILS RELATIVE PERCENT: 70 %
PDW BLD-RTO: 14.2 % (ref 12.4–15.4)
PERFORMED ON: ABNORMAL
PERFORMED ON: ABNORMAL
PLATELET # BLD: 248 K/UL (ref 135–450)
PMV BLD AUTO: 9.2 FL (ref 5–10.5)
POTASSIUM REFLEX MAGNESIUM: 3.8 MMOL/L (ref 3.5–5.1)
RBC # BLD: 3.93 M/UL (ref 4–5.2)
SODIUM BLD-SCNC: 141 MMOL/L (ref 136–145)
TOTAL PROTEIN: 6.9 G/DL (ref 6.4–8.2)
WBC # BLD: 9.6 K/UL (ref 4–11)

## 2021-01-09 PROCEDURE — 2500000003 HC RX 250 WO HCPCS: Performed by: INTERNAL MEDICINE

## 2021-01-09 PROCEDURE — 2580000003 HC RX 258: Performed by: STUDENT IN AN ORGANIZED HEALTH CARE EDUCATION/TRAINING PROGRAM

## 2021-01-09 PROCEDURE — 6360000002 HC RX W HCPCS: Performed by: STUDENT IN AN ORGANIZED HEALTH CARE EDUCATION/TRAINING PROGRAM

## 2021-01-09 PROCEDURE — 94761 N-INVAS EAR/PLS OXIMETRY MLT: CPT

## 2021-01-09 PROCEDURE — 94640 AIRWAY INHALATION TREATMENT: CPT

## 2021-01-09 PROCEDURE — 2580000003 HC RX 258: Performed by: INTERNAL MEDICINE

## 2021-01-09 PROCEDURE — 80053 COMPREHEN METABOLIC PANEL: CPT

## 2021-01-09 PROCEDURE — 85379 FIBRIN DEGRADATION QUANT: CPT

## 2021-01-09 PROCEDURE — 6370000000 HC RX 637 (ALT 250 FOR IP): Performed by: STUDENT IN AN ORGANIZED HEALTH CARE EDUCATION/TRAINING PROGRAM

## 2021-01-09 PROCEDURE — 85025 COMPLETE CBC W/AUTO DIFF WBC: CPT

## 2021-01-09 PROCEDURE — 85384 FIBRINOGEN ACTIVITY: CPT

## 2021-01-09 RX ORDER — DEXAMETHASONE 6 MG/1
6 TABLET ORAL DAILY
Qty: 7 TABLET | Refills: 0 | Status: SHIPPED | OUTPATIENT
Start: 2021-01-10 | End: 2021-01-17

## 2021-01-09 RX ORDER — PANTOPRAZOLE SODIUM 20 MG/1
20 TABLET, DELAYED RELEASE ORAL DAILY
Qty: 30 TABLET | Refills: 3 | Status: SHIPPED | OUTPATIENT
Start: 2021-01-09

## 2021-01-09 RX ADMIN — Medication 2000 UNITS: at 08:52

## 2021-01-09 RX ADMIN — INSULIN LISPRO 1 UNITS: 100 INJECTION, SOLUTION INTRAVENOUS; SUBCUTANEOUS at 08:57

## 2021-01-09 RX ADMIN — Medication 10 ML: at 10:00

## 2021-01-09 RX ADMIN — REMDESIVIR 100 MG: 100 INJECTION, POWDER, LYOPHILIZED, FOR SOLUTION INTRAVENOUS at 10:00

## 2021-01-09 RX ADMIN — SERTRALINE HYDROCHLORIDE 25 MG: 25 TABLET ORAL at 08:52

## 2021-01-09 RX ADMIN — ASPIRIN 81 MG: 81 TABLET, CHEWABLE ORAL at 08:52

## 2021-01-09 RX ADMIN — LISINOPRIL 5 MG: 5 TABLET ORAL at 08:52

## 2021-01-09 RX ADMIN — PANTOPRAZOLE SODIUM 40 MG: 40 TABLET, DELAYED RELEASE ORAL at 05:27

## 2021-01-09 RX ADMIN — BUDESONIDE AND FORMOTEROL FUMARATE DIHYDRATE 1 PUFF: 80; 4.5 AEROSOL RESPIRATORY (INHALATION) at 07:45

## 2021-01-09 RX ADMIN — INSULIN LISPRO 2 UNITS: 100 INJECTION, SOLUTION INTRAVENOUS; SUBCUTANEOUS at 12:30

## 2021-01-09 RX ADMIN — DEXAMETHASONE 6 MG: 4 TABLET ORAL at 08:52

## 2021-01-09 RX ADMIN — ENOXAPARIN SODIUM 40 MG: 40 INJECTION SUBCUTANEOUS at 08:52

## 2021-01-09 ASSESSMENT — PAIN SCALES - GENERAL: PAINLEVEL_OUTOF10: 0

## 2021-01-09 NOTE — PROGRESS NOTES
Discharge instructions given to patient reguarding medications, follow up appts, when to return to ER, S/S of blood clot and insulin administration. Handouts given on how to administer insulin. Patient transported to main entrance per w/c where daughter waiting to provide transport home.  Discharge instructions also given to daughter who verbalized understanding

## 2021-01-09 NOTE — DISCHARGE SUMMARY
Hospital Medicine Discharge Summary    Patient ID: Sasha Portillo   Gender: female  : 1945   Age: 66 y.o. MRN: [id number]  Code Status: Full Code   Patient's PCP: Norberto Moise DO    Admit Date: 2021     Discharge Date:   2021    Admitting Physician: Darin Baumann MD     Discharge Physician: Mainor Carlos MD     Discharge Diagnoses: Active Hospital Problems    Diagnosis Date Noted    Hypoxia [R09.02] 2021    DM (diabetes mellitus), type 2, uncontrolled ([facility] [address] [facility] [E11.65] 2021    Essential hypertension [I10] 2021    Viral pneumonia [J12.9] 2021       The patient was seen and examined on day of discharge and this discharge summary is in conjunction with any daily progress note from day of discharge. Hospital Course:     66 y. o. female with a PMHx of Obesity, DMII, HTN, HLD, who p/w shortness of breath since 21. She had associated dry cough and increased dyspnea on exertion. Denies diarrhea, anosmia, anorexia, abd pain, fever chills. Reported her daughter was sick with sore throat and tested positive for covid. She was noted to be hypoxic on room air requiring 3L NC. She also had lymphopenia. CXR showed patchy bilateral pneumonia. She tested positive for covid and was treated with decadron and remdesivir. She worked well with PT/OT. Her oxygen was able to be weaned down. Her sugars were elevated 2/2 steroid use and required insulin. She is to complete decadron full 10 days course and follow-up with PCP in 1 week w/ tele-health visit. She is to maintain social isolation for 2 weeks and observe proper masking. On day of discharge pt was stable and denies dyspnea, chest pain, abd pain, dysuria, confusion. She was in understanding and agreement of her her discharge plan.    Pt will DC on low dose Eliquis for 2 weeks as Prophylaxis for COVID  PPI prophylaxes since she is on steroid    Lantus 10 U for 8 days, since she is on Steroid  Continue Metformin Disposition:  Home    Physical Exam Performed:     /71   Pulse 65   Temp 97.5 °F (36.4 °C) (Oral)   Resp 16   Ht 5' 4.02\" (1.626 m)   Wt 193 lb (87.5 kg)   SpO2 93%   BMI 33.11 kg/m²       General appearance:  No apparent distress, appears stated age and cooperative. HEENT:  Normal cephalic, atraumatic without obvious deformity. Pupils equal, round, and reactive to light. Extra ocular muscles intact. Conjunctivae/corneas clear. Neck: Supple, with full range of motion. No jugular venous distention. Trachea midline. Respiratory:  Normal respiratory effort. Clear to auscultation, bilaterally without Rales/Wheezes/Rhonchi. Cardiovascular:  Regular rate and rhythm with normal S1/S2 without murmurs, rubs or gallops. Abdomen: Soft, non-tender, non-distended with normal bowel sounds. Musculoskeletal:  No clubbing, cyanosis or edema bilaterally. Full range of motion without deformity. Skin: Skin color, texture, turgor normal.  No rashes or lesions. Neurologic:  Neurovascularly intact without any focal sensory/motor deficits. Cranial nerves: II-XII intact, grossly non-focal.  Psychiatric:  Alert and oriented, thought content appropriate, normal insight  Capillary Refill: Brisk,< 3 seconds   Peripheral Pulses: +2 palpable, equal bilaterally       Labs: For convenience and continuity at follow-up the following most recent labs are provided:      CBC:    Lab Results   Component Value Date    WBC 9.6 01/09/2021    HGB 11.6 01/09/2021    HCT 33.5 01/09/2021     01/09/2021       Renal:    Lab Results   Component Value Date     01/09/2021    K 3.8 01/09/2021     01/09/2021    CO2 23 01/09/2021    BUN 21 01/09/2021    CREATININE 0.7 01/09/2021    CALCIUM 9.4 01/09/2021         Significant Diagnostic Studies    Radiology:   CT CHEST PULMONARY EMBOLISM W CONTRAST   Final Result      Patchy bilateral pneumonitis. Mildly prominent hilar/mediastinal lymph nodes are likely reactive.       No

## 2021-01-11 ENCOUNTER — CARE COORDINATION (OUTPATIENT)
Dept: CASE MANAGEMENT | Age: 76
End: 2021-01-11

## 2021-01-15 ENCOUNTER — CARE COORDINATION (OUTPATIENT)
Dept: CASE MANAGEMENT | Age: 76
End: 2021-01-15

## 2021-01-15 NOTE — CARE COORDINATION
to self, and explanation of the CTN role. CTN reviewed discharge instructions, medical action plan and red flags with patient who verbalized understanding. Patient given an opportunity to ask questions and does not have any further questions or concerns at this time. Were discharge instructions available to patient? Yes     Reviewed appropriate site of care based on symptoms and resources available to patient including:      PCP   Specialist   After hour contact number   Urgent Care Clinics   When to call 911    The patient agrees to contact the PCP office for questions related to their healthcare. Medication reconciliation was performed with Patient, who verbalizes understanding of administration of home medications. Advised obtaining a 90-day supply of all daily and as-needed medications. Covid Risk Education    Patient has following risk factors of:   DM   Pneumonia    Education provided regarding infection prevention, and signs and symptoms of COVID-19 and when to seek medical attention with patient who verbalized understanding. Discussed exposure protocols and quarantine From CDC: Are you at higher risk for severe illness?   and given an opportunity for questions and concerns. The patient agrees to contact the COVID-19 hotline [phone] or PCP office for questions related to COVID-19. For more information on steps you can take to protect yourself, see CDC's How to Protect Yourself     Patient given information for [facility] and agrees to enroll:  yes and declined     Discussed follow-up appointments. If no appointment was previously scheduled, appointment scheduling offered:  yes, Pt declined assistance and will schedule at convenience     Is follow up appointment scheduled within 7 days of discharge? Yes, pcp 1/14    [facility] appointment(s):     Plan for f/u call in 7-10 days based on severity of symptoms and risk factors.     CTN provided contact information for future needs.    SUMMARY  CTC spoke to the Pt who states she is \"doing good\" but still has SOB with exertion that improves with rest.  Pt reports she is checking her oxygen saturation at home and has not needed to use supplemental oxygen since discharging home. Pt declined to review all meds with CTC but did review new, changed meds. Pt has virtual appt with PCP on 1/14. Pt will take all meds as prescribed and schedule / keep doctors appt. [facility] provided education on s/s that require medical attention and when to seek medical attention. [facility] advised Pt of use urgent care or physicians 24 hr access line if assistance is needed after hours or on the weekend. Pt denies any needs or concerns and is agreeable with additional calls. Follow Up  No future appointments.     Amalia Anglin RN

## 2021-01-22 ENCOUNTER — CARE COORDINATION (OUTPATIENT)
Dept: CASE MANAGEMENT | Age: 76
End: 2021-01-22

## 2021-08-15 ENCOUNTER — APPOINTMENT (OUTPATIENT)
Dept: CT IMAGING | Age: 76
End: 2021-08-15
Payer: MEDICARE

## 2021-08-15 ENCOUNTER — APPOINTMENT (OUTPATIENT)
Dept: GENERAL RADIOLOGY | Age: 76
End: 2021-08-15
Payer: MEDICARE

## 2021-08-15 ENCOUNTER — HOSPITAL ENCOUNTER (EMERGENCY)
Age: 76
Discharge: HOME | End: 2021-08-15
Attending: STUDENT IN AN ORGANIZED HEALTH CARE EDUCATION/TRAINING PROGRAM
Payer: MEDICARE

## 2021-08-15 VITALS
TEMPERATURE: 97.9 F | WEIGHT: 200 LBS | DIASTOLIC BLOOD PRESSURE: 65 MMHG | HEIGHT: 64 IN | HEART RATE: 92 BPM | RESPIRATION RATE: 18 BRPM | SYSTOLIC BLOOD PRESSURE: 141 MMHG | BODY MASS INDEX: 34.15 KG/M2 | OXYGEN SATURATION: 98 %

## 2021-08-15 DIAGNOSIS — R10.11 ABDOMINAL PAIN, RIGHT UPPER QUADRANT: Primary | ICD-10-CM

## 2021-08-15 LAB
ALBUMIN SERPL-MCNC: 4.5 G/DL (ref 3.4–5)
ALP BLD-CCNC: 77 U/L (ref 40–129)
ALT SERPL-CCNC: 22 U/L (ref 10–40)
ANION GAP SERPL CALCULATED.3IONS-SCNC: 16 MMOL/L (ref 3–16)
AST SERPL-CCNC: 22 U/L (ref 15–37)
BASOPHILS ABSOLUTE: 0 K/UL (ref 0–0.2)
BASOPHILS RELATIVE PERCENT: 0.6 %
BILIRUB SERPL-MCNC: 0.4 MG/DL (ref 0–1)
BILIRUBIN DIRECT: <0.2 MG/DL (ref 0–0.3)
BILIRUBIN URINE: NEGATIVE
BILIRUBIN, INDIRECT: NORMAL MG/DL (ref 0–1)
BLOOD, URINE: NEGATIVE
BUN BLDV-MCNC: 14 MG/DL (ref 7–20)
CALCIUM SERPL-MCNC: 9.7 MG/DL (ref 8.3–10.6)
CHLORIDE BLD-SCNC: 102 MMOL/L (ref 99–110)
CLARITY: CLEAR
CO2: 22 MMOL/L (ref 21–32)
COLOR: YELLOW
CREAT SERPL-MCNC: 0.7 MG/DL (ref 0.6–1.2)
EOSINOPHILS ABSOLUTE: 0.1 K/UL (ref 0–0.6)
EOSINOPHILS RELATIVE PERCENT: 1.4 %
GFR AFRICAN AMERICAN: >60
GFR NON-AFRICAN AMERICAN: >60
GLUCOSE BLD-MCNC: 148 MG/DL (ref 70–99)
GLUCOSE URINE: NEGATIVE MG/DL
HCT VFR BLD CALC: 40 % (ref 36–48)
HEMOGLOBIN: 13.9 G/DL (ref 12–16)
KETONES, URINE: NEGATIVE MG/DL
LEUKOCYTE ESTERASE, URINE: NEGATIVE
LIPASE: 49 U/L (ref 13–60)
LYMPHOCYTES ABSOLUTE: 2.2 K/UL (ref 1–5.1)
LYMPHOCYTES RELATIVE PERCENT: 28 %
MCH RBC QN AUTO: 30.1 PG (ref 26–34)
MCHC RBC AUTO-ENTMCNC: 34.8 G/DL (ref 31–36)
MCV RBC AUTO: 86.4 FL (ref 80–100)
MICROSCOPIC EXAMINATION: NORMAL
MONOCYTES ABSOLUTE: 0.7 K/UL (ref 0–1.3)
MONOCYTES RELATIVE PERCENT: 9.4 %
NEUTROPHILS ABSOLUTE: 4.7 K/UL (ref 1.7–7.7)
NEUTROPHILS RELATIVE PERCENT: 60.6 %
NITRITE, URINE: NEGATIVE
PDW BLD-RTO: 14.5 % (ref 12.4–15.4)
PH UA: 5.5 (ref 5–8)
PLATELET # BLD: 191 K/UL (ref 135–450)
PMV BLD AUTO: 9.5 FL (ref 5–10.5)
POTASSIUM REFLEX MAGNESIUM: 4 MMOL/L (ref 3.5–5.1)
PROTEIN UA: NEGATIVE MG/DL
RBC # BLD: 4.63 M/UL (ref 4–5.2)
SODIUM BLD-SCNC: 140 MMOL/L (ref 136–145)
SPECIFIC GRAVITY UA: >=1.03 (ref 1–1.03)
TOTAL PROTEIN: 7.7 G/DL (ref 6.4–8.2)
TROPONIN: <0.01 NG/ML
URINE REFLEX TO CULTURE: NORMAL
URINE TYPE: NORMAL
UROBILINOGEN, URINE: 0.2 E.U./DL
WBC # BLD: 7.7 K/UL (ref 4–11)

## 2021-08-15 PROCEDURE — 93005 ELECTROCARDIOGRAM TRACING: CPT | Performed by: PHYSICIAN ASSISTANT

## 2021-08-15 PROCEDURE — 99284 EMERGENCY DEPT VISIT MOD MDM: CPT

## 2021-08-15 PROCEDURE — 80048 BASIC METABOLIC PNL TOTAL CA: CPT

## 2021-08-15 PROCEDURE — 80076 HEPATIC FUNCTION PANEL: CPT

## 2021-08-15 PROCEDURE — 6360000004 HC RX CONTRAST MEDICATION: Performed by: PHYSICIAN ASSISTANT

## 2021-08-15 PROCEDURE — 74177 CT ABD & PELVIS W/CONTRAST: CPT

## 2021-08-15 PROCEDURE — 83690 ASSAY OF LIPASE: CPT

## 2021-08-15 PROCEDURE — 71045 X-RAY EXAM CHEST 1 VIEW: CPT

## 2021-08-15 PROCEDURE — 6370000000 HC RX 637 (ALT 250 FOR IP): Performed by: PHYSICIAN ASSISTANT

## 2021-08-15 PROCEDURE — 81003 URINALYSIS AUTO W/O SCOPE: CPT

## 2021-08-15 PROCEDURE — 6360000002 HC RX W HCPCS: Performed by: PHYSICIAN ASSISTANT

## 2021-08-15 PROCEDURE — 84484 ASSAY OF TROPONIN QUANT: CPT

## 2021-08-15 PROCEDURE — 96374 THER/PROPH/DIAG INJ IV PUSH: CPT

## 2021-08-15 PROCEDURE — 85025 COMPLETE CBC W/AUTO DIFF WBC: CPT

## 2021-08-15 RX ORDER — LIDOCAINE 50 MG/G
1 PATCH TOPICAL DAILY
Qty: 15 PATCH | Refills: 0 | Status: SHIPPED | OUTPATIENT
Start: 2021-08-15

## 2021-08-15 RX ORDER — FAMOTIDINE 20 MG/1
20 TABLET, FILM COATED ORAL 2 TIMES DAILY
Qty: 60 TABLET | Refills: 1 | Status: SHIPPED | OUTPATIENT
Start: 2021-08-15

## 2021-08-15 RX ORDER — LIDOCAINE 4 G/G
1 PATCH TOPICAL DAILY
Status: DISCONTINUED | OUTPATIENT
Start: 2021-08-15 | End: 2021-08-15 | Stop reason: HOSPADM

## 2021-08-15 RX ORDER — KETOROLAC TROMETHAMINE 30 MG/ML
15 INJECTION, SOLUTION INTRAMUSCULAR; INTRAVENOUS ONCE
Status: COMPLETED | OUTPATIENT
Start: 2021-08-15 | End: 2021-08-15

## 2021-08-15 RX ADMIN — KETOROLAC TROMETHAMINE 15 MG: 30 INJECTION, SOLUTION INTRAMUSCULAR; INTRAVENOUS at 14:46

## 2021-08-15 RX ADMIN — IOPAMIDOL 80 ML: 755 INJECTION, SOLUTION INTRAVENOUS at 14:53

## 2021-08-15 ASSESSMENT — PAIN - FUNCTIONAL ASSESSMENT
PAIN_FUNCTIONAL_ASSESSMENT: ACTIVITIES ARE NOT PREVENTED
PAIN_FUNCTIONAL_ASSESSMENT: 0-10
PAIN_FUNCTIONAL_ASSESSMENT: ACTIVITIES ARE NOT PREVENTED

## 2021-08-15 ASSESSMENT — PAIN SCALES - GENERAL
PAINLEVEL_OUTOF10: 5
PAINLEVEL_OUTOF10: 3
PAINLEVEL_OUTOF10: 5

## 2021-08-15 ASSESSMENT — ENCOUNTER SYMPTOMS
VOMITING: 0
NAUSEA: 1
SHORTNESS OF BREATH: 0
ABDOMINAL PAIN: 1
DIARRHEA: 1
CONSTIPATION: 0

## 2021-08-15 ASSESSMENT — PAIN DESCRIPTION - FREQUENCY
FREQUENCY: CONTINUOUS
FREQUENCY: CONTINUOUS

## 2021-08-15 ASSESSMENT — PAIN DESCRIPTION - DESCRIPTORS: DESCRIPTORS: SHARP

## 2021-08-15 ASSESSMENT — PAIN DESCRIPTION - PAIN TYPE: TYPE: ACUTE PAIN

## 2021-08-15 ASSESSMENT — PAIN DESCRIPTION - PROGRESSION
CLINICAL_PROGRESSION: NOT CHANGED
CLINICAL_PROGRESSION: GRADUALLY IMPROVING

## 2021-08-15 ASSESSMENT — PAIN DESCRIPTION - ONSET
ONSET: GRADUAL
ONSET: GRADUAL

## 2021-08-15 ASSESSMENT — PAIN DESCRIPTION - LOCATION
LOCATION: ABDOMEN
LOCATION: ABDOMEN

## 2021-08-15 ASSESSMENT — PAIN DESCRIPTION - ORIENTATION
ORIENTATION: RIGHT;LOWER
ORIENTATION: RIGHT;LOWER

## 2021-08-15 NOTE — ED PROVIDER NOTES
[facility]          PHYSICIAN ASSISTANT NOTE     Date of evaluation: 8/15/2021    ADDENDUM:      Care of this patient was assumed from [place], [place]. The patient was seen for Abdominal Pain (since [address])    The patient's initial evaluation and plan have been discussed with the prior provider who initially evaluated the patient. Nursing Notes, Past Medical Hx, Past Surgical Hx, Social Hx, Allergies, and Family Hx were all reviewed. Patient presented with epigastric/right upper quadrant abdominal pain that has been present for several days. Laboratory studies were reassuring. Pending at time of turnover was CT evaluation and disposition. Diagnostic Results     EKG   See prior documentation    RADIOLOGY:  CT ABDOMEN PELVIS W IV CONTRAST Additional Contrast? None   Final Result      1. No findings for nephro ureterolithiasis or obstructive uropathy. No findings within the abdomen or pelvis to explain patient's symptoms. 2.  Colonic and sigmoid diverticulosis without findings of acute diverticulitis. 3.  Status post cholecystectomy and hysterectomy. XR CHEST PORTABLE   Final Result   1. Mild prominence of the lung markings both lung bases without localized consolidation or pleural effusion.           LABS:   Results for orders placed or performed during the hospital encounter of 08/15/21   CBC Auto Differential   Result Value Ref Range    WBC 7.7 4.0 - 11.0 K/uL    RBC 4.63 4.00 - 5.20 M/uL    Hemoglobin 13.9 12.0 - 16.0 g/dL    Hematocrit 40.0 36.0 - 48.0 %    MCV 86.4 80.0 - 100.0 fL    MCH 30.1 26.0 - 34.0 pg    MCHC 34.8 31.0 - 36.0 g/dL    RDW 14.5 12.4 - 15.4 %    Platelets 781 114 - 458 K/uL    MPV 9.5 5.0 - 10.5 fL    Neutrophils % 60.6 %    Lymphocytes % 28.0 %    Monocytes % 9.4 %    Eosinophils % 1.4 %    Basophils % 0.6 %    Neutrophils Absolute 4.7 1.7 - 7.7 K/uL    Lymphocytes Absolute 2.2 1.0 - 5.1 K/uL    Monocytes Absolute 0.7 0.0 - 1.3 K/uL    Eosinophils Absolute 0.1 0.0 - 0.6 K/uL    Basophils Absolute 0.0 0.0 - 0.2 K/uL   Basic Metabolic Panel w/ Reflex to MG   Result Value Ref Range    Sodium 140 136 - 145 mmol/L    Potassium reflex Magnesium 4.0 3.5 - 5.1 mmol/L    Chloride 102 99 - 110 mmol/L    CO2 22 21 - 32 mmol/L    Anion Gap 16 3 - 16    Glucose 148 (H) 70 - 99 mg/dL    BUN 14 7 - 20 mg/dL    CREATININE 0.7 0.6 - 1.2 mg/dL    GFR Non-African American >60 >60    GFR African American >60 >60    Calcium 9.7 8.3 - 10.6 mg/dL   Hepatic Function Panel   Result Value Ref Range    Total Protein 7.7 6.4 - 8.2 g/dL    Albumin 4.5 3.4 - 5.0 g/dL    Alkaline Phosphatase 77 40 - 129 U/L    ALT 22 10 - 40 U/L    AST 22 15 - 37 U/L    Total Bilirubin 0.4 0.0 - 1.0 mg/dL    Bilirubin, Direct <0.2 0.0 - 0.3 mg/dL    Bilirubin, Indirect see below 0.0 - 1.0 mg/dL   Troponin   Result Value Ref Range    Troponin <0.01 <0.01 ng/mL   Lipase   Result Value Ref Range    Lipase 49.0 13.0 - 60.0 U/L   Urinalysis Reflex to Culture    Specimen: Urine, clean catch   Result Value Ref Range    Color, UA Yellow Straw/Yellow    Clarity, UA Clear Clear    Glucose, Ur Negative Negative mg/dL    Bilirubin Urine Negative Negative    Ketones, Urine Negative Negative mg/dL    Specific Gravity, UA >=1.030 1.005 - 1.030    Blood, Urine Negative Negative    pH, UA 5.5 5.0 - 8.0    Protein, UA Negative Negative mg/dL    Urobilinogen, Urine 0.2 <2.0 E.U./dL    Nitrite, Urine Negative Negative    Leukocyte Esterase, Urine Negative Negative    Microscopic Examination Not Indicated     Urine Type not given     Urine Reflex to Culture Not Indicated        RECENT VITALS:  BP: (!) 165/82, Temp: 97.9 °F (36.6 °C), Pulse: 92, Resp: 18, SpO2: 99 %     Procedures     none    ED Course     The patient was given the following medications:  Orders Placed This Encounter   Medications    ketorolac (TORADOL) injection 15 mg    iopamidol (ISOVUE-370) 76 % injection 80 mL

## 2021-08-15 NOTE — ED PROVIDER NOTES
[facility] [place] [facility]          PHYSICIAN ASSISTANT NOTE       Date of evaluation: 8/15/2021    Chief Complaint     Abdominal Pain (since [address])      History of Present Illness     HPI: Promise Benjamin is a 66 y.o. female with history of diabetes, HLD, HTN who presents to the emergency department with epigastric, RUQ and R flank pain. Patient's pain started on Tuesday. She has had associated decreased appetite, denies any correlation with her pain with eating. She has had some mild nausea, no episodes of vomiting. She has loose stools at baseline, denies any worsening of this or associated black/bloody stools. She denies any urinary symptoms. She denies any radiating chest pain or difficulty breathing. She denies any associated fevers or chills. She has not had similar symptoms in the past.  She has a history of of cholecystectomy and appendectomy. With the exception of the above, there are no aggravating or alleviating factors. Review of Systems     Review of Systems   Constitutional: Negative for chills and fever. Respiratory: Negative for shortness of breath. Cardiovascular: Negative for chest pain. Gastrointestinal: Positive for abdominal pain, diarrhea and nausea. Negative for constipation and vomiting. Genitourinary: Negative for dysuria, frequency and hematuria. Neurological: Negative for dizziness and headaches. As stated above, all other systems reviewed and are otherwise negative. Past Medical, Surgical, Family, and Social History     She has a past medical history of Arthritis, Diabetes mellitus ([address] Hyperlipidemia, Hypertension, Nausea & vomiting, Obese, and Spinal stenosis. She has a past surgical history that includes Hysterectomy; Dilation and curettage of uterus; Cholecystectomy, laparoscopic ([id number]); eye surgery; and joint replacement. Her family history is not on file. She reports that she quit smoking about 30 years ago.  She does not have any smokeless tobacco history on file. She reports current alcohol use. She reports that she does not use drugs. Medications     Previous Medications    ALBUTEROL SULFATE HFA (VENTOLIN HFA) 108 (90 BASE) MCG/ACT INHALER    Inhale 2 puffs into the lungs every 6 hours as needed for Wheezing    ASPIRIN 81 MG CHEWABLE TABLET    Take 81 mg by mouth daily     CICLOPIROX 1 % SHAM    Apply topically daily Apply to affected toenails    INSULIN GLARGINE (LANTUS;BASAGLAR) 100 UNIT/ML INJECTION PEN    Inject 10 Units into the skin daily for 8 days    LISINOPRIL (PRINIVIL;ZESTRIL) 5 MG TABLET    Take 5 mg by mouth daily     METFORMIN (GLUCOPHAGE-XR) 500 MG EXTENDED RELEASE TABLET    Take 1,000 mg by mouth 2 times daily (with meals)    MULTIPLE VITAMINS-MINERALS (PX SENIOR VITAMIN PO)    Take  by mouth. PANTOPRAZOLE (PROTONIX) 20 MG TABLET    Take 1 tablet by mouth daily    ROSUVASTATIN (CRESTOR) 20 MG TABLET    Take 20 mg by mouth nightly    SERTRALINE (ZOLOFT) 25 MG TABLET    Take 25 mg by mouth daily    VITAMIN D (CHOLECALCIFEROL) 1000 UNIT CAPS CAPSULE    Take 1,000 Units by mouth daily. Allergies     She is allergic to codeine and statins depletion therapy. Physical Exam     INITIAL VITALS: BP: (!) 143/71, Temp: 97.9 °F (36.6 °C), Pulse: 92, Resp: 18, SpO2: 97 %  Physical Exam  Vitals and nursing note reviewed. Constitutional:       General: She is not in acute distress. Appearance: Normal appearance. She is normal weight. She is not ill-appearing, toxic-appearing or diaphoretic. HENT:      Head: Normocephalic and atraumatic. Right Ear: External ear normal.      Left Ear: External ear normal.      Nose: Nose normal.      Mouth/Throat:      Mouth: Mucous membranes are moist.      Pharynx: Oropharynx is clear. Eyes:      Extraocular Movements: Extraocular movements intact. Conjunctiva/sclera: Conjunctivae normal.   Cardiovascular:      Rate and Rhythm: Normal rate. Pulses: Normal pulses. Pulmonary:      Effort: Pulmonary effort is normal. No respiratory distress. Abdominal:      General: Abdomen is flat. Palpations: Abdomen is soft. Comments: Mild tenderness to RUQ and epigastrium, no rebound or guarding. Mild R sided CVA tenderness. No distention or rigidity. Musculoskeletal:         General: Normal range of motion. Cervical back: Normal range of motion. Skin:     General: Skin is warm and dry. Neurological:      General: No focal deficit present. Mental Status: She is alert. Mental status is at baseline. Psychiatric:         Mood and Affect: Mood normal.         Behavior: Behavior normal.       Diagnostic Results     EKG   Interpreted in conjunction with emergency department physician Marty Gonzales MD  Rhythm: normal sinus   Rate: normal  Axis: normal  : none  Conduction: normal  ST Segments: no acute change  T Waves: no acute change  Q Waves:none  Clinical Impression: no acute changes    RADIOLOGY:  XR CHEST PORTABLE   Final Result   1. Mild prominence of the lung markings both lung bases without localized consolidation or pleural effusion.       CT ABDOMEN PELVIS W IV CONTRAST Additional Contrast? None    (Results Pending)       LABS:   Results for orders placed or performed during the hospital encounter of 08/15/21   CBC Auto Differential   Result Value Ref Range    WBC 7.7 4.0 - 11.0 K/uL    RBC 4.63 4.00 - 5.20 M/uL    Hemoglobin 13.9 12.0 - 16.0 g/dL    Hematocrit 40.0 36.0 - 48.0 %    MCV 86.4 80.0 - 100.0 fL    MCH 30.1 26.0 - 34.0 pg    MCHC 34.8 31.0 - 36.0 g/dL    RDW 14.5 12.4 - 15.4 %    Platelets 674 951 - 345 K/uL    MPV 9.5 5.0 - 10.5 fL    Neutrophils % 60.6 %    Lymphocytes % 28.0 %    Monocytes % 9.4 %    Eosinophils % 1.4 %    Basophils % 0.6 %    Neutrophils Absolute 4.7 1.7 - 7.7 K/uL    Lymphocytes Absolute 2.2 1.0 - 5.1 K/uL    Monocytes Absolute 0.7 0.0 - 1.3 K/uL    Eosinophils Absolute 0.1 0.0 - 0.6 K/uL disposition plans were discussed and agreed upon. Clinical Impression     1. Abdominal pain, epigastric      This note was dictated using voice-recognition software, which occasionally leads to inadvertent typographic errors. Disposition     DISPOSITION  - Pending CT abdomen.      MCKAYLA Pappas  08/15/21 4678

## 2021-08-16 LAB
EKG ATRIAL RATE: 101 BPM
EKG ATRIAL RATE: 70 BPM
EKG DIAGNOSIS: NORMAL
EKG DIAGNOSIS: NORMAL
EKG P AXIS: 29 DEGREES
EKG P-R INTERVAL: 164 MS
EKG Q-T INTERVAL: 384 MS
EKG Q-T INTERVAL: 422 MS
EKG QRS DURATION: 90 MS
EKG QRS DURATION: 92 MS
EKG QTC CALCULATION (BAZETT): 451 MS
EKG QTC CALCULATION (BAZETT): 455 MS
EKG R AXIS: -13 DEGREES
EKG R AXIS: 34 DEGREES
EKG T AXIS: 188 DEGREES
EKG T AXIS: 38 DEGREES
EKG VENTRICULAR RATE: 70 BPM
EKG VENTRICULAR RATE: 83 BPM

## 2021-08-16 RX ORDER — DULAGLUTIDE 0.75 MG/.5ML
INJECTION, SOLUTION SUBCUTANEOUS
COMMUNITY
Start: 2021-07-26

## 2021-08-16 RX ORDER — SIMVASTATIN 40 MG
TABLET ORAL
COMMUNITY
Start: 2021-07-17

## 2021-10-06 ENCOUNTER — HOSPITAL ENCOUNTER (OUTPATIENT)
Dept: ULTRASOUND IMAGING | Age: 76
Discharge: HOME | End: 2021-10-06
Payer: MEDICARE

## 2021-10-06 DIAGNOSIS — R10.11 ABDOMINAL PAIN, RIGHT UPPER QUADRANT: ICD-10-CM

## 2021-10-06 PROCEDURE — 76700 US EXAM ABDOM COMPLETE: CPT

## 2022-12-14 ENCOUNTER — ANESTHESIA (OUTPATIENT)
Dept: ENDOSCOPY | Age: 77
End: 2022-12-14
Payer: MEDICARE

## 2022-12-14 ENCOUNTER — HOSPITAL ENCOUNTER (OUTPATIENT)
Age: 77
Setting detail: OUTPATIENT SURGERY
Discharge: HOME | End: 2022-12-14
Attending: INTERNAL MEDICINE | Admitting: INTERNAL MEDICINE
Payer: MEDICARE

## 2022-12-14 ENCOUNTER — ANESTHESIA EVENT (OUTPATIENT)
Dept: ENDOSCOPY | Age: 77
End: 2022-12-14
Payer: MEDICARE

## 2022-12-14 VITALS
TEMPERATURE: 97.7 F | SYSTOLIC BLOOD PRESSURE: 164 MMHG | HEIGHT: 65 IN | OXYGEN SATURATION: 98 % | HEART RATE: 73 BPM | DIASTOLIC BLOOD PRESSURE: 85 MMHG | RESPIRATION RATE: 18 BRPM | BODY MASS INDEX: 33.32 KG/M2 | WEIGHT: 200 LBS

## 2022-12-14 DIAGNOSIS — Z86.010 HISTORY OF COLON POLYPS: ICD-10-CM

## 2022-12-14 LAB
GLUCOSE BLD-MCNC: 144 MG/DL (ref 70–99)
PERFORMED ON: ABNORMAL

## 2022-12-14 PROCEDURE — 2709999900 HC NON-CHARGEABLE SUPPLY: Performed by: INTERNAL MEDICINE

## 2022-12-14 PROCEDURE — 3700000000 HC ANESTHESIA ATTENDED CARE: Performed by: INTERNAL MEDICINE

## 2022-12-14 PROCEDURE — 7100000011 HC PHASE II RECOVERY - ADDTL 15 MIN: Performed by: INTERNAL MEDICINE

## 2022-12-14 PROCEDURE — 88305 TISSUE EXAM BY PATHOLOGIST: CPT

## 2022-12-14 PROCEDURE — 7100000010 HC PHASE II RECOVERY - FIRST 15 MIN: Performed by: INTERNAL MEDICINE

## 2022-12-14 PROCEDURE — 2580000003 HC RX 258: Performed by: ANESTHESIOLOGY

## 2022-12-14 PROCEDURE — 3700000001 HC ADD 15 MINUTES (ANESTHESIA): Performed by: INTERNAL MEDICINE

## 2022-12-14 PROCEDURE — 6360000002 HC RX W HCPCS: Performed by: NURSE ANESTHETIST, CERTIFIED REGISTERED

## 2022-12-14 PROCEDURE — 3609010600 HC COLONOSCOPY POLYPECTOMY SNARE/COLD BIOPSY: Performed by: INTERNAL MEDICINE

## 2022-12-14 RX ORDER — LIDOCAINE HYDROCHLORIDE 10 MG/ML
1 INJECTION, SOLUTION EPIDURAL; INFILTRATION; INTRACAUDAL; PERINEURAL
Status: DISCONTINUED | OUTPATIENT
Start: 2022-12-14 | End: 2022-12-14 | Stop reason: HOSPADM

## 2022-12-14 RX ORDER — SODIUM CHLORIDE 0.9 % (FLUSH) 0.9 %
5-40 SYRINGE (ML) INJECTION EVERY 12 HOURS SCHEDULED
Status: DISCONTINUED | OUTPATIENT
Start: 2022-12-14 | End: 2022-12-14 | Stop reason: HOSPADM

## 2022-12-14 RX ORDER — LIDOCAINE HYDROCHLORIDE 20 MG/ML
INJECTION, SOLUTION INTRAVENOUS PRN
Status: DISCONTINUED | OUTPATIENT
Start: 2022-12-14 | End: 2022-12-14 | Stop reason: SDUPTHER

## 2022-12-14 RX ORDER — PROPOFOL 10 MG/ML
INJECTION, EMULSION INTRAVENOUS CONTINUOUS PRN
Status: DISCONTINUED | OUTPATIENT
Start: 2022-12-14 | End: 2022-12-14 | Stop reason: SDUPTHER

## 2022-12-14 RX ORDER — SODIUM CHLORIDE 0.9 % (FLUSH) 0.9 %
5-40 SYRINGE (ML) INJECTION PRN
Status: DISCONTINUED | OUTPATIENT
Start: 2022-12-14 | End: 2022-12-14 | Stop reason: HOSPADM

## 2022-12-14 RX ORDER — SODIUM CHLORIDE, SODIUM LACTATE, POTASSIUM CHLORIDE, CALCIUM CHLORIDE 600; 310; 30; 20 MG/100ML; MG/100ML; MG/100ML; MG/100ML
INJECTION, SOLUTION INTRAVENOUS CONTINUOUS
Status: DISCONTINUED | OUTPATIENT
Start: 2022-12-14 | End: 2022-12-14 | Stop reason: HOSPADM

## 2022-12-14 RX ORDER — SODIUM CHLORIDE 9 MG/ML
INJECTION, SOLUTION INTRAVENOUS PRN
Status: DISCONTINUED | OUTPATIENT
Start: 2022-12-14 | End: 2022-12-14 | Stop reason: HOSPADM

## 2022-12-14 RX ADMIN — PROPOFOL 183.76 MCG/KG/MIN: 10 INJECTION, EMULSION INTRAVENOUS at 09:57

## 2022-12-14 RX ADMIN — SODIUM CHLORIDE, POTASSIUM CHLORIDE, SODIUM LACTATE AND CALCIUM CHLORIDE: 600; 310; 30; 20 INJECTION, SOLUTION INTRAVENOUS at 08:53

## 2022-12-14 RX ADMIN — LIDOCAINE HYDROCHLORIDE 100 MG: 20 INJECTION, SOLUTION INTRAVENOUS at 09:57

## 2022-12-14 ASSESSMENT — PAIN - FUNCTIONAL ASSESSMENT: PAIN_FUNCTIONAL_ASSESSMENT: 0-10

## 2022-12-14 NOTE — PROCEDURES
Colonoscopy Procedure  Note          Patient: Landon Evangelista  : 1945  CRN:  @[email or web address]@    Procedure: Colonoscopy with polypectomy (cold snare)    Date:  2022    Surgeon:  Tino Chester MD, MD    Referring Physician:  Anne Rinaldi DO    Preoperative Diagnosis:  History of colon polyps [Z86.010]    Postoperative Diagnosis:  * No post-op diagnosis entered *    Anesthesia:  MAC    EBL: Minimal to none. Indications: This is a 76y.o. year old female who presents today with previous adenomatous polyp. Procedure: An informed consent was obtained from the patient after explanation of indications, benefits, possible risks and complications of the procedure. The patient was then taken to the endoscopy suite, placed in the left lateral decubitus position, and the above IV anesthesia was administered. Digital rectal examination was performed. With the patient in the left lateral decubitus position the endoscope was inserted through the anorectal area into the rectum. The scope was then advanced through the length of the colon to the terminal ileum. The quality of preparation was adequate. The scope was carefully withdrawn and the mucosa was fully inspected including retroflexion in the rectum. Findings and interventions are described below. The patient tolerated the procedure well and was taken to the PACU in good condition. There were no immediate complications. Impression:    Normal terminal ileum. Polyp-7 mm, sessile, in the mid transverse colon removed by cold snare technique completely. Rest of the colon mucosa was unremarkable. Moderately severe left-sided diverticulosis. Severe sigmoid tortuosity. Moderate internal hemorrhoids. Recommendations: Follow-up biopsies in the next 7 to 10 days. Assuming polyps are benign, given patient's age, follow-up colonoscopy for polyp surveillance is not recommended.   Annual FIT could be considered as an alternative test for colon cancer screening. Metamucil sugar-free-1 tablespoonful orally once daily. Medardo Arriola MD, MD   [facility]  12/14/2022      Please note that some or all of this record was generated using voice recognition software. If there are any questions about the content of this document, please contact the author as some errors in translation may have occurred.

## 2022-12-14 NOTE — PROGRESS NOTES
Ambulatory Surgery/Procedure Discharge Note    Vitals:    12/14/22 1046   BP: (!) 164/85   Pulse: 73   Resp: 18   Temp:    SpO2: 98%     Patient meets criteria for discharge per Angelica score. In: 500 [I.V.:500]  Out: -     Restroom use offered before discharge. Yes    Pain assessment:  none  Pain Level: 0    Dr. Jennifer Jo spoke with pt's son in law via phone. Patient denies nausea or discomfort. Tolerating PO. Discharge instructions reviewed with patient/responsible adult and understanding verbalized. Discharge instructions signed and copies given. Patient discharged home with belongings.    12/14/2022 10:55 AM

## 2022-12-14 NOTE — ANESTHESIA POSTPROCEDURE EVALUATION
Department of Anesthesiology  Postprocedure Note    Patient: Qamar Mejia  MRN: [id number]  YOB: 1945  Date of evaluation: 12/14/2022      Procedure Summary     Date: 12/14/22 Room / Location: [facility]    Anesthesia Start: 2922 Anesthesia Stop: 9093    Procedure: COLONOSCOPY POLYPECTOMY SNARE/COLD BIOPSY Diagnosis:       History of colon polyps      (History of colon polyps [Z86.010])    Surgeons: Nishant Sheldon MD Responsible Provider: Connie Mehta MD    Anesthesia Type: MAC ASA Status: 3          Anesthesia Type: No value filed.     Angelica Phase I: Angelica Score: 10    Angelica Phase II: Angelica Score: 8      Anesthesia Post Evaluation    Patient location during evaluation: PACU  Patient participation: complete - patient participated  Level of consciousness: awake and alert  Airway patency: patent  Nausea & Vomiting: no nausea and no vomiting  Complications: no  Cardiovascular status: hemodynamically stable  Respiratory status: acceptable  Hydration status: euvolemic  Multimodal analgesia pain management approach

## 2022-12-14 NOTE — PROGRESS NOTES
Discharge instructions reviewed with patient/responsible adult and understanding verbalized. Discharge instructions signed and copies given. Patient discharged home with belongings. Left per w/c no complaints.

## 2022-12-14 NOTE — DISCHARGE INSTRUCTIONS
Follow-up biopsies in the next 7 to 10 days. Metamucil sugar-free 1 tablespoonful orally once daily. ENDOSCOPY DISCHARGE INSTRUCTIONS:    Call the physician that did your procedure for any questions or concern:    [facility]: [phone]  DR. MONIE AYALA      ACTIVITY:    There are potential side effects to the medications used for sedation and anesthesia during your procedure. These include:  Dizziness or light-headedness, confusion or memory loss, delayed reaction times, loss of coordination, nausea and vomiting. Because of your increased risk for injury, we ask that you observe the following precautions: For the next 24 hours,  DO NOT operate an automobile, bicycle, motorcycle, , power tools or large equipment of any kind. Do not drink alcohol, sign any legal documents or make any legal decisions for 24 hours. Do not bend your head over lower than your heart. DO sit on the side of bed/couch awhile before getting up. Plan on bedrest or quiet relaxation today. You may resume normal activities in 24 hours. DIET:    Your first meal today should be light, avoiding spicy and fatty foods. If you tolerate this first meal, then you may advance to your regular diet unless otherwise advised by your physician. NORMAL SYMPTOMS:  -Mild sore throat if youve had an EGD   -Gaseous discomfort    NOTIFY YOUR PHYSICIAN IF THESE SYMPTOMS OCCUR:  1. Fever (greater than 100)  5. Increased abdominal bloating  2. Severe pain    6. Excessive bleeding  3. Nausea and vomiting  7. Chest pain                                                                    4. Chills    8. Shortness of breath    ADDITIONAL INSTRUCTIONS:    Biopsy results: Call [address],[facility] for biopsy results in 1 week    Please review these discharge instructions this evening or tomorrow for  information you may have forgotten. We want to thank you for choosing the [facility] as your health care provider.  We always strive to provide you with excellent care while you are here. You may receive a survey in the mail regarding your care. We would appreciate you taking a few minutes of your time to complete this survey.

## 2022-12-14 NOTE — H&P
Gastroenterology Note             Pre-operative History and Physical    Patient: Kenneth Chin  : 1945  CSN: [id number]    History Obtained From:  patient and/or guardian. HISTORY OF PRESENT ILLNESS:    The patient is a 76 y.o. female  here for polyp surveillance. Past Medical History:    Past Medical History:   Diagnosis Date    Arthritis     right hip    Diabetes mellitus ([address]     Hyperlipidemia     Hypertension     Nausea & vomiting     Obese     Spinal stenosis      Past Surgical History:    Past Surgical History:   Procedure Laterality Date    CHOLECYSTECTOMY, LAPAROSCOPIC  [id number]    LAPAROSCOPIC CHOLECYSTECTOMY WITH CHOLANGIOGRAM    DILATION AND CURETTAGE OF UTERUS      EYE SURGERY      narrow angle glaucomma, surgery 2017    HYSTERECTOMY (CERVIX STATUS UNKNOWN)      JOINT REPLACEMENT      right hip replacement, Dr. Minesh Grant      Medications Prior to Admission:   No current facility-administered medications on file prior to encounter. Current Outpatient Medications on File Prior to Encounter   Medication Sig Dispense Refill    Calcium Carbonate Antacid (CALCIUM CARBONATE PO) Take by mouth      Celecoxib (CELEBREX PO) Take by mouth      TRULICITY 7.55 KY/8.6DA SOPN ADMINISTER 0.75 MG UNDER THE SKIN 1 TIME A WEEK      simvastatin (ZOCOR) 40 MG tablet TAKE 1 TABLET BY MOUTH AT BEDTIME      famotidine (PEPCID) 20 MG tablet Take 1 tablet by mouth 2 times daily 60 tablet 1    lidocaine (LIDODERM) 5 % Place 1 patch onto the skin daily 12 hours on, 12 hours off.  (Patient not taking: Reported on 2022) 15 patch 0    pantoprazole (PROTONIX) 20 MG tablet Take 1 tablet by mouth daily 30 tablet 3    insulin glargine (LANTUS;BASAGLAR) 100 UNIT/ML injection pen Inject 10 Units into the skin daily for 8 days 1 pen 0    metFORMIN (GLUCOPHAGE-XR) 500 MG extended release tablet Take 1,000 mg by mouth 2 times daily (with meals)      albuterol sulfate HFA (PROVENTIL;VENTOLIN;PROAIR) 108 (90 Base) MCG/ACT inhaler Inhale 2 puffs into the lungs every 6 hours as needed for Wheezing      Ciclopirox 1 % SHAM Apply topically daily Apply to affected toenails (Patient not taking: Reported on 2022)      sertraline (ZOLOFT) 25 MG tablet Take 25 mg by mouth daily      lisinopril (PRINIVIL;ZESTRIL) 5 MG tablet Take 5 mg by mouth daily       Multiple Vitamins-Minerals (PX SENIOR VITAMIN PO) Take  by mouth. Vitamin D (CHOLECALCIFEROL) 1000 UNIT CAPS capsule Take 1,000 Units by mouth daily. aspirin 81 MG chewable tablet Take 81 mg by mouth daily           Allergies:  Codeine and Statins depletion therapy      Social History:   Social History     Tobacco Use    Smoking status: Former     Types: Cigarettes     Quit date: 1990     Years since quittin.9    Smokeless tobacco: Not on file   Substance Use Topics    Alcohol use: Yes     Comment: rare     Family History:   History reviewed. No pertinent family history. PHYSICAL EXAM:      BP (!) 165/78   Pulse 77   Temp 98.2 °F (36.8 °C) (Temporal)   Resp 15   Ht 5' 4.5\" (1.638 m)   Wt 200 lb (90.7 kg)   SpO2 94%   BMI 33.80 kg/m²  I        Heart:   within normal limits    Lungs:  CTA bilat anteriorly,  Normal effort    Abdomen:   soft, NT, ND      ASA Grade:  ASA 3 - Patient with moderate systemic disease with functional limitations    Mallampati Class: 2      ASSESSMENT AND PLAN:    1. Patient is a 76 y.o. female here for EGD/Colonoscopy. 2.  Procedure options, risks and benefits reviewed with patient. Patient expresses understanding and wishes to proceed. Anny Beaulieu MD,   [facility]  2022    Please note that some or all of this record was generated using voice recognition software. If there are any questions about the content of this document, please contact the author as some errors in translation may have occurred. Polyp surveillance. History of multiple colon polyps.

## 2022-12-14 NOTE — ANESTHESIA PRE PROCEDURE
Department of Anesthesiology  Preprocedure Note       Name:  Priti Aleman   Age:  76 y.o.  :  1945                                          MRN:  [id number]         Date:  2022      Surgeon: Raphael Meyer):  Andrey Larsen MD    Procedure: Procedure(s):  COLONOSCOPY    Medications prior to admission:   Prior to Admission medications    Medication Sig Start Date End Date Taking? Authorizing Provider   Calcium Carbonate Antacid (CALCIUM CARBONATE PO) Take by mouth   Yes Historical Provider, MD   Celecoxib (CELEBREX PO) Take by mouth   Yes Historical Provider, MD   TRULICITY 6.51 ZB/8.5AO SOPN ADMINISTER 0.75 MG UNDER THE SKIN 1 TIME A WEEK 21   Historical Provider, MD   simvastatin (ZOCOR) 40 MG tablet TAKE 1 TABLET BY MOUTH AT BEDTIME 21   Historical Provider, MD   famotidine (PEPCID) 20 MG tablet Take 1 tablet by mouth 2 times daily 8/15/21   MCKAYLA Christy   lidocaine (LIDODERM) 5 % Place 1 patch onto the skin daily 12 hours on, 12 hours off.   Patient not taking: Reported on 2022 8/15/21   MCKAYLA Christy   pantoprazole (PROTONIX) 20 MG tablet Take 1 tablet by mouth daily 21   Cinthya Cotto MD   insulin glargine (LANTUS;BASAGLAR) 100 UNIT/ML injection pen Inject 10 Units into the skin daily for 8 days 21  Cinthya Cotto MD   metFORMIN (GLUCOPHAGE-XR) 500 MG extended release tablet Take 1,000 mg by mouth 2 times daily (with meals)    Historical Provider, MD   albuterol sulfate HFA (PROVENTIL;VENTOLIN;PROAIR) 108 (90 Base) MCG/ACT inhaler Inhale 2 puffs into the lungs every 6 hours as needed for Wheezing    Historical Provider, MD   Ciclopirox 1 % SHAM Apply topically daily Apply to affected toenails  Patient not taking: Reported on 2022    Historical Provider, MD   sertraline (ZOLOFT) 25 MG tablet Take 25 mg by mouth daily    Historical Provider, MD   lisinopril (PRINIVIL;ZESTRIL) 5 MG tablet Take 5 mg by mouth daily     Historical Provider, MD Multiple Vitamins-Minerals (PX SENIOR VITAMIN PO) Take  by mouth. Historical Provider, MD   Vitamin D (CHOLECALCIFEROL) 1000 UNIT CAPS capsule Take 1,000 Units by mouth daily. Historical Provider, MD   aspirin 81 MG chewable tablet Take 81 mg by mouth daily     Historical Provider, MD       Current medications:    Current Facility-Administered Medications   Medication Dose Route Frequency Provider Last Rate Last Admin    lidocaine PF 1 % injection 1 mL  1 mL IntraDERmal Once PRN Rubi Hartman MD        lactated ringers infusion   IntraVENous Continuous Rubi Hartman  mL/hr at 12/14/22 0853 New Bag at 12/14/22 0853    sodium chloride flush 0.9 % injection 5-40 mL  5-40 mL IntraVENous 2 times per day Rubi Hartman MD        sodium chloride flush 0.9 % injection 5-40 mL  5-40 mL IntraVENous PRN Rubi Hartman MD        0.9 % sodium chloride infusion   IntraVENous PRN Rubi Hartman MD           Allergies:     Allergies   Allergen Reactions    Codeine Other (See Comments)     headache    Statins Depletion Therapy      myalgia       Problem List:    Patient Active Problem List   Diagnosis Code    Viral pneumonia J12.9    Hypoxia R09.02    DM (diabetes mellitus), type 2, uncontrolled [id number]    Essential hypertension I10       Past Medical History:        Diagnosis Date    Arthritis     right hip    Diabetes mellitus ([address]     Hyperlipidemia     Hypertension     Nausea & vomiting     Obese     Spinal stenosis        Past Surgical History:        Procedure Laterality Date    CHOLECYSTECTOMY, LAPAROSCOPIC  [id number]    LAPAROSCOPIC CHOLECYSTECTOMY WITH CHOLANGIOGRAM    DILATION AND CURETTAGE OF UTERUS      EYE SURGERY      narrow angle glaucomma, surgery 09/25/2017    HYSTERECTOMY (CERVIX STATUS UNKNOWN)      JOINT REPLACEMENT      right hip replacement, Dr. Silvia Scott        Social History:    Social History     Tobacco Use    Smoking status: Former     Types: Cigarettes     Quit date: 1990     Years since quittin.9    Smokeless tobacco: Not on file   Substance Use Topics    Alcohol use: Yes     Comment: rare                                Counseling given: Not Answered      Vital Signs (Current):   Vitals:    22 0831   BP: (!) 165/78   Pulse: 77   Resp: 15   Temp: 98.2 °F (36.8 °C)   TempSrc: Temporal   SpO2: 94%   Weight: 200 lb (90.7 kg)   Height: 5' 4.5\" (1.638 m)                                              BP Readings from Last 3 Encounters:   22 (!) 165/78   08/15/21 (!) 141/65   21 138/71       NPO Status: Time of last liquid consumption: 0400                        Time of last solid consumption: 1800                        Date of last liquid consumption: 22                        Date of last solid food consumption: 22    BMI:   Wt Readings from Last 3 Encounters:   22 200 lb (90.7 kg)   08/15/21 200 lb (90.7 kg)   21 193 lb (87.5 kg)     Body mass index is 33.8 kg/m².     CBC:   Lab Results   Component Value Date/Time    WBC 7.7 08/15/2021 01:12 PM    RBC 4.63 08/15/2021 01:12 PM    HGB 13.9 08/15/2021 01:12 PM    HCT 40.0 08/15/2021 01:12 PM    MCV 86.4 08/15/2021 01:12 PM    RDW 14.5 08/15/2021 01:12 PM     08/15/2021 01:12 PM       CMP:   Lab Results   Component Value Date/Time     08/15/2021 01:12 PM    K 4.0 08/15/2021 01:12 PM     08/15/2021 01:12 PM    CO2 22 08/15/2021 01:12 PM    BUN 14 08/15/2021 01:12 PM    CREATININE 0.7 08/15/2021 01:12 PM    GFRAA >60 08/15/2021 01:12 PM    AGRATIO 1.3 2021 05:25 AM    LABGLOM >60 08/15/2021 01:12 PM    GLUCOSE 148 08/15/2021 01:12 PM    PROT 7.7 08/15/2021 01:12 PM    CALCIUM 9.7 08/15/2021 01:12 PM    BILITOT 0.4 08/15/2021 01:12 PM    ALKPHOS 77 08/15/2021 01:12 PM    AST 22 08/15/2021 01:12 PM    ALT 22 08/15/2021 01:12 PM       POC Tests:   Recent Labs     22  0852   POCGLU 144*       Coags: No results found for: PROTIME, INR, APTT    HCG (If Applicable): No results found for: PREGTESTUR, PREGSERUM, HCG, HCGQUANT     ABGs: No results found for: PHART, PO2ART, ZHX1LGO, YEY6DGT, BEART, P0JTPLER     Type & Screen (If Applicable):  No results found for: LABABO, LABRH    Drug/Infectious Status (If Applicable):  No results found for: HIV, HEPCAB    COVID-19 Screening (If Applicable):   Lab Results   Component Value Date/Time    COVID19 DETECTED 01/06/2021 06:05 PM           Anesthesia Evaluation  Patient summary reviewed and Nursing notes reviewed no history of anesthetic complications:   Airway: Mallampati: II  TM distance: >3 FB   Neck ROM: full  Mouth opening: > = 3 FB   Dental: normal exam         Pulmonary:normal exam    (+) pneumonia:                             Cardiovascular:    (+) hypertension:,                   Neuro/Psych:   Negative Neuro/Psych ROS              GI/Hepatic/Renal: Neg GI/Hepatic/Renal ROS            Endo/Other:    (+) DiabetesType II DM, , .                 Abdominal:             Vascular: negative vascular ROS. Other Findings:           Anesthesia Plan      MAC     ASA 3       Induction: intravenous. MIPS: Prophylactic antiemetics administered. Anesthetic plan and risks discussed with patient. Plan discussed with CRNA.     Attending anesthesiologist reviewed and agrees with Preprocedure content                Mirian Rai MD   12/14/2022

## (undated) DEVICE — SNARE COLD DIAMOND 10MM THIN

## (undated) DEVICE — CANNULA SAMP CO2 AD GRN 7FT CO2 AND 7FT O2 TBNG UNIV CONN

## (undated) DEVICE — TRAP POLYP ETRAP